# Patient Record
Sex: FEMALE | Race: WHITE | NOT HISPANIC OR LATINO | Employment: STUDENT | URBAN - METROPOLITAN AREA
[De-identification: names, ages, dates, MRNs, and addresses within clinical notes are randomized per-mention and may not be internally consistent; named-entity substitution may affect disease eponyms.]

---
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
## 2018-01-11 NOTE — PROGRESS NOTES
Assessment    1  Encounter for preventive health examination (V70 0) (Z00 00)   2  Need for hepatitis A immunization (V05 3) (Z23)   3  Never a smoker    Plan  Health Maintenance    · Always use a seat belt and shoulder strap when riding or driving a motor vehicle ;  Status:Complete;   Done: 29XNW2059   · Begin or continue regular aerobic exercise  Gradually work up to at least 3 sessions of 30  minutes of exercise a week ; Status:Complete;   Done: 42BOK7774   · Do not use aspirin for anyone under 25years of age ; Status:Complete;   Done:  80RBU9316   · Good hand washing is one of the best ways to control the spread of germs ;  Status:Complete;   Done: 35PCU0806   · There are many ways to reduce your risk of catching or spreading a sexually transmitted  Infection ; Status:Complete;   Done: 12NFX8846   · Using a latex condom can help prevent pregnancy  It can also help to prevent the spread  of sexually transmitted infections ; Status:Complete;   Done: 40QGB7637   · We recommend regular contraceptive use to prevent an unplanned pregnancy ;  Status:Complete;   Done: 26GGY0400  Need for hepatitis A immunization    · Vaqta 50 UNIT/ML Intramuscular Suspension  Need for meningococcal vaccination    · Trumenba Intramuscular Suspension Prefilled Syringe    Discussion/Summary    Impression:   No growth, development, elimination, feeding, skin and sleep concerns  no medical problems  Anticipatory guidance addressed as per the history of present illness section  encouraged to return in 6 months for Hep A #2 and Trumenba #2 Vaccinations to be administered include hepatitis A and Trumenba  No medication changes  Information discussed with patient  College form was completed and returned at the time of her visit  The treatment plan was reviewed with the patient/guardian   The patient/guardian understands and agrees with the treatment plan      Chief Complaint  Physical      History of Present Illness  HM, 12-18 years Female (Brief): Alcon Leach presents today for routine health maintenance with her mother  General Health: The last health maintenance visit was 1 5 years ago  Dental hygiene: Good The patient brushes 2 times daily  Caregiver concerns:   Caregivers deny concerns regarding nutrition, sleep, behavior, school, development and elimination  Menstrual status: The patient is menarcheal    Menses: Menstrual history: The cycles have been regular  The duration of her recent periods usually last 5-6 days  Nutrition/Elimination:   Diet:  her current diet is diverse and healthy  (limited junk - drinks mostly sweetened teas)  Dietary supplements: no daily multivitamins  No elimination issues are expressed  Sleep:  No sleep issues are reported  Sleep patterns: She sleeps for 7-8 hours at night  Behavior: Behavior issues: no tobacco use, no alcohol use, no drug use and no sexual risk taking  No behavior issues identified  Health Risks:     Risk factors: exposure to pets and 1 cat, but no household domestic violence  Safety elements used: seat belt, smoke detectors, carbon monoxide detectors and sun safety  Weekly activity:  occasional walks  Childcare/School: She is in grade 12, 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  Sports Participation Questions:      Review of Systems    Constitutional: no chills and no fever  ENT: no nasal discharge and no sore throat  Cardiovascular: no chest pain and no palpitations  Respiratory: no cough, no shortness of breath and no wheezing  Gastrointestinal: no nausea, no vomiting and no diarrhea  Genitourinary: no dysuria and no vaginal discharge  Musculoskeletal: no myalgias  Integumentary: no rashes and no skin lesions  Neurological: no headache, no dizziness and no fainting  Psychiatric: no depression and no anxiety  Hematologic/Lymphatic: no tendency for easy bleeding and no tendency for easy bruising  ROS reported by the patient  Active Problems    1  Allergic rhinitis (477 9) (J30 9)   2  Flu vaccine need (V04 81) (Z23)   3  Headache (784 0) (R51)   4  History of abdominal pain (V13 89) (Z87 898)   5  Migraine headache (346 90) (G43 909)   6  Need for revaccination (V05 9) (Z23)    Past Medical History    · History of Acute upper respiratory infection (465 9) (J06 9)   · History of abdominal pain (V13 89) (Z87 898)   · History of fatigue (V13 89) (Z87 898)   · History of pharyngitis (V12 69) (Z87 09)    Family History  Mother    · Family history of Headache Syndromes  Father    · Family history of Anxiety Disorder NOS  Maternal Grandmother    · Family history of Diabetes Mellitus (V18 0)   · Family history of malignant neoplasm of ovary (V16 41) (Z80 41)   · Family history of Headache Syndromes   · Family history of Metastatic bone cancer    Social History    · Never a smoker   · Never Drank Alcohol    Current Meds   1  Bill Hayes 1/35 1-35 MG-MCG Oral Tablet; Therapy: [id number] to (Evaluate:76Gge1557) Recorded    Allergies    1  No Known Drug Allergies    Vitals   Recorded: 58OTM0882 10:06AM   Temperature 98 1 F   Heart Rate 88   Systolic 935   Diastolic 78   Height 5 ft 5 3 in   Weight 126 lb 2 oz   BMI Calculated 20 8   BSA Calculated 1 63   BMI Percentile 42 %   2-20 Stature Percentile 65 %   2-20 Weight Percentile 52 %     Physical Exam    Constitutional - General appearance: No acute distress, well appearing and well nourished  Head and Face - Head and face: Normocephalic, atraumatic  Eyes - Conjunctiva and lids: No injection, edema or discharge  Pupils and irises: Equal, round, reactive to light bilaterally  Ears, Nose, Mouth, and Throat - External inspection of ears and nose: Normal without deformities or discharge  Otoscopic examination: Tympanic membranes gray, translucent with good bony landmarks and light reflex  Canals patent without erythema   Hearing: Normal  Nasal mucosa, septum, and turbinates: Normal, no edema or discharge  Lips, teeth, and gums: Normal, good dentition  Oropharynx: Moist mucosa, normal tongue and tonsils without lesions  Neck - Neck: Supple, symmetric, no masses  Thyroid: No thyromegaly  Pulmonary - Respiratory effort: Normal respiratory rate and rhythm, no increased work of breathing  Auscultation of lungs: Clear bilaterally  Cardiovascular - Auscultation of heart: Regular rate and rhythm, normal S1 and S2, no murmur  Peripheral vascular exam: Normal  Radial: right 2+ and left 2+  Posterior tibialis: right 2+ and left 2+  Examination of extremities for edema and/or varicosities: Normal  no edema  Abdomen - Abdomen: Normal bowel sounds, soft, non-tender, no masses  Liver and spleen: No hepatomegaly or splenomegaly  Lymphatic - Palpation of lymph nodes in neck: No anterior or posterior cervical lymphadenopathy  Musculoskeletal - Gait and station: Normal gait  Evaluation for scoliosis: No scoliosis on exam  Muscle strength/tone: Normal  Motor Strength Findings: normal upper extremity strength and normal lower extremity strength  Skin - Skin and subcutaneous tissue: Normal    Neurologic - Sensation: Normal  Sensory exam: intact to light touch  Psychiatric - Mood and affect: Normal       Results/Data  PHQ-2 Adult Depression Screening 82GAQ1102 10:09AM User, s     Test Name Result Flag Reference   PHQ-2 Adult Depression Score 0     Over the last two weeks, how often have you been bothered by any of the following problems?   Little interest or pleasure in doing things: Not at all - 0  Feeling down, depressed, or hopeless: Not at all - 0   PHQ-2 Adult Depression Screening Negative         Procedure    Procedure:   Results: 20/20 in both eyes without corrective device, 20/25 in the right eye without corrective device, 20/30 in the left eye without corrective device      Future Appointments    Date/Time Provider Specialty Site   12/21/2017 01:30 PM Richard Nurse Schedule  [address] [facility] MEDICINE     Signatures   Electronically signed by : Katherine Carroll, [address]; Jun 19 2017 11:22AM EST                       (Author)    Electronically signed by : RUSTY Diez ; Jun 19 2017  7:36PM EST

## 2018-01-13 VITALS
BODY MASS INDEX: 21.01 KG/M2 | WEIGHT: 126.13 LBS | DIASTOLIC BLOOD PRESSURE: 78 MMHG | TEMPERATURE: 98.1 F | HEART RATE: 88 BPM | SYSTOLIC BLOOD PRESSURE: 102 MMHG | HEIGHT: 65 IN

## 2018-03-07 NOTE — PROGRESS NOTES
History of Present Illness    Revaccination   Vaccine Information: Vaccine(s) Given (names): menactra 11/2/15  Spoke with family regarding vaccine out of temperature range and risks and benefits of revaccination  Action(s): Pt will be revaccinated  Pt called (attempt 1): [id number] pp  No Show Appt(s): [id number]  Revaccination Completed: [id number]  Active Problems    1  Allergic rhinitis (477 9) (J30 9)   2  Flu vaccine need (V04 81) (Z23)   3  Headache (784 0) (R51)   4  History of abdominal pain (V13 89) (Z87 898)   5  Migraine headache (346 90) (G43 909)    Immunizations  DTP/DTaP --- [facility]: 85-Kzw-4788Vlsbz Jeannine: 11-May-1999; Radha Swan: 15-Jul-1999; Series4:  16-Jan-2002; Series5: 04-Apr-2003   Hepatitis B --- Orrington Ege: 64-Xta-1243Encks Maroon: 15-Jul-1999; Series3: 16-Jan-2002   HIB --- Orrington Ege: 70-Jbi-2967Zezmh Maroon: 15-Jul-1999; Series3: 16-Jan-2002   HPV --- [facility]: 25-Jan-2011; Mae Moran: 14-Nov-2011; Series3: 13-Nov-2012   Influenza --- Series1: Temporarily Deferred: Pt refuses; Mae Moran: 44-IKM-4244Wcrrir Roes:  20-Dim-9260Vfbnnp Relic: 23-Dec-2013; Series5: 02-Nov-2015   Meningococcal --- Series1: 25-Jan-2011   MMR --- Series1: 16-Jan-2002; Mae Moran: 04-Apr-2003   Polio --- Orrington Ege: 85-Dqk-6905Hifer Maroon: 10-APC-7120; Series3: 16-Jan-2002; Series4: 21-Apr-2004   Tdap --- Orrington Ege: 07-Feb-2011   Varicella --- Series1: 16-Jan-2002; Series2: 02-Nov-2008     Current Meds   1  Kelnor 1/35 1-35 MG-MCG Oral Tablet    Allergies    1  No Known Drug Allergies    Plan    1   [facility] [address] [place] [address] Menactra Intramuscular Injectable    Education  Education Items with no Session   [facility] [address] [place] [address] Menactra Intramuscular Injectable;  Provided: 47WYJ1815 10:36AM;  Counselor: Lexa Regalado;     Signatures   Electronically signed by : Dino Rosario, [facility]; Dec 16 2016 12:42PM EST                       (Author)

## 2018-03-18 ENCOUNTER — HOSPITAL ENCOUNTER (EMERGENCY)
Facility: HOSPITAL | Age: 18
Discharge: HOME | End: 2018-03-18
Attending: EMERGENCY MEDICINE
Payer: COMMERCIAL

## 2018-03-18 VITALS
DIASTOLIC BLOOD PRESSURE: 71 MMHG | WEIGHT: 125 LBS | TEMPERATURE: 98.5 F | RESPIRATION RATE: 18 BRPM | HEART RATE: 87 BPM | SYSTOLIC BLOOD PRESSURE: 132 MMHG | OXYGEN SATURATION: 98 % | HEIGHT: 64 IN | BODY MASS INDEX: 21.34 KG/M2

## 2018-03-18 DIAGNOSIS — R55 SYNCOPE: Primary | ICD-10-CM

## 2018-03-18 LAB
ALBUMIN SERPL BCP-MCNC: 3.8 G/DL (ref 3.5–5)
ALP SERPL-CCNC: 49 U/L (ref 46–384)
ALT SERPL W P-5'-P-CCNC: 13 U/L (ref 12–78)
ANION GAP SERPL CALCULATED.3IONS-SCNC: 8 MMOL/L (ref 4–13)
AST SERPL W P-5'-P-CCNC: 15 U/L (ref 5–45)
ATRIAL RATE: 77 BPM
ATRIAL RATE: 89 BPM
BASOPHILS # BLD AUTO: 0.04 THOUSANDS/ΜL (ref 0–0.1)
BASOPHILS NFR BLD AUTO: 0 % (ref 0–1)
BILIRUB SERPL-MCNC: 0.47 MG/DL (ref 0.2–1)
BUN SERPL-MCNC: 9 MG/DL (ref 5–25)
CALCIUM SERPL-MCNC: 8.7 MG/DL (ref 8.3–10.1)
CHLORIDE SERPL-SCNC: 109 MMOL/L (ref 100–108)
CO2 SERPL-SCNC: 25 MMOL/L (ref 21–32)
CREAT SERPL-MCNC: 0.77 MG/DL (ref 0.6–1.3)
EOSINOPHIL # BLD AUTO: 0.04 THOUSAND/ΜL (ref 0–0.61)
EOSINOPHIL NFR BLD AUTO: 0 % (ref 0–6)
ERYTHROCYTE [DISTWIDTH] IN BLOOD BY AUTOMATED COUNT: 12.9 % (ref 11.6–15.1)
EXT PREG TEST URINE: NEGATIVE
GFR SERPL CREATININE-BSD FRML MDRD: 112 ML/MIN/1.73SQ M
GLUCOSE SERPL-MCNC: 93 MG/DL (ref 65–140)
HCT VFR BLD AUTO: 36.8 % (ref 34.8–46.1)
HGB BLD-MCNC: 12.6 G/DL (ref 11.5–15.4)
HOLD SPECIMEN: NORMAL
LYMPHOCYTES # BLD AUTO: 1.88 THOUSANDS/ΜL (ref 0.6–4.47)
LYMPHOCYTES NFR BLD AUTO: 21 % (ref 14–44)
MCH RBC QN AUTO: 31.3 PG (ref 26.8–34.3)
MCHC RBC AUTO-ENTMCNC: 34.2 G/DL (ref 31.4–37.4)
MCV RBC AUTO: 91 FL (ref 82–98)
MONOCYTES # BLD AUTO: 0.25 THOUSAND/ΜL (ref 0.17–1.22)
MONOCYTES NFR BLD AUTO: 3 % (ref 4–12)
NEUTROPHILS # BLD AUTO: 6.81 THOUSANDS/ΜL (ref 1.85–7.62)
NEUTS SEG NFR BLD AUTO: 76 % (ref 43–75)
NRBC BLD AUTO-RTO: 0 /100 WBCS
P AXIS: 64 DEGREES
P AXIS: 71 DEGREES
PLATELET # BLD AUTO: 372 THOUSANDS/UL (ref 149–390)
PMV BLD AUTO: 9.2 FL (ref 8.9–12.7)
POTASSIUM SERPL-SCNC: 3.6 MMOL/L (ref 3.5–5.3)
PR INTERVAL: 188 MS
PR INTERVAL: 200 MS
PROT SERPL-MCNC: 7.7 G/DL (ref 6.4–8.2)
QRS AXIS: 102 DEGREES
QRS AXIS: 93 DEGREES
QRSD INTERVAL: 70 MS
QRSD INTERVAL: 78 MS
QT INTERVAL: 336 MS
QT INTERVAL: 360 MS
QTC INTERVAL: 407 MS
QTC INTERVAL: 408 MS
RBC # BLD AUTO: 4.03 MILLION/UL (ref 3.81–5.12)
SODIUM SERPL-SCNC: 142 MMOL/L (ref 136–145)
T WAVE AXIS: 32 DEGREES
T WAVE AXIS: 7 DEGREES
VENTRICULAR RATE: 77 BPM
VENTRICULAR RATE: 89 BPM
WBC # BLD AUTO: 9.05 THOUSAND/UL (ref 4.31–10.16)

## 2018-03-18 PROCEDURE — 93010 ELECTROCARDIOGRAM REPORT: CPT | Performed by: INTERNAL MEDICINE

## 2018-03-18 PROCEDURE — 93005 ELECTROCARDIOGRAM TRACING: CPT

## 2018-03-18 PROCEDURE — 85025 COMPLETE CBC W/AUTO DIFF WBC: CPT | Performed by: EMERGENCY MEDICINE

## 2018-03-18 PROCEDURE — 99285 EMERGENCY DEPT VISIT HI MDM: CPT

## 2018-03-18 PROCEDURE — 96360 HYDRATION IV INFUSION INIT: CPT

## 2018-03-18 PROCEDURE — 81025 URINE PREGNANCY TEST: CPT | Performed by: EMERGENCY MEDICINE

## 2018-03-18 PROCEDURE — 80053 COMPREHEN METABOLIC PANEL: CPT | Performed by: EMERGENCY MEDICINE

## 2018-03-18 PROCEDURE — 36415 COLL VENOUS BLD VENIPUNCTURE: CPT

## 2018-03-18 RX ADMIN — SODIUM CHLORIDE 1000 ML: 0.9 INJECTION, SOLUTION INTRAVENOUS at 15:33

## 2018-03-18 NOTE — ED PROVIDER NOTES
History  Chief Complaint   Patient presents with    Loss of Consciousness     Patient reports she was in bathroom when she started to feel super hot and went outside and had a syncopal episode; pt  reports +Loc, patient states she hit her face/mouth; pt  states nothing like this has ever happened to her before      21year old female presents for evaluation of syncope  Patient reports she was getting ready for work this morning and while brushing her hair she started to feel sweaty and lightheaded  She then walked outside to get some fresh air and passed out, fell down a few steps and states she hit her face on the concrete  She believes she woke up immediately but states she still felt dazed  Patient's boyfriend and sister report she appeared very diaphoretic and pale  She drank mountain dew and improved significantly  She is currently asymptomatic  Denies preceding chest pain, sob, headache, fam hx of sudden cardiac death  None       History reviewed  No pertinent past medical history  History reviewed  No pertinent surgical history  History reviewed  No pertinent family history  I have reviewed and agree with the history as documented  Social History   Substance Use Topics    Smoking status: Never Smoker    Smokeless tobacco: Never Used    Alcohol use Yes        Review of Systems   Constitutional: Positive for diaphoresis  Negative for chills and fever  HENT: Negative for congestion and rhinorrhea  Eyes: Negative for pain and visual disturbance  Respiratory: Negative for cough, shortness of breath and wheezing  Cardiovascular: Negative for chest pain and leg swelling  Gastrointestinal: Positive for nausea  Negative for abdominal pain, diarrhea and vomiting  Genitourinary: Negative for difficulty urinating, dysuria, frequency and urgency  Musculoskeletal: Negative for back pain and neck pain  Skin: Negative for color change and rash     Neurological: Positive for syncope and light-headedness  Negative for numbness and headaches  All other systems reviewed and are negative  Physical Exam  ED Triage Vitals [03/18/18 1421]   Temperature Pulse Respirations Blood Pressure SpO2   98 5 °F (36 9 °C) 98 18 153/95 98 %      Temp Source Heart Rate Source Patient Position - Orthostatic VS BP Location FiO2 (%)   Tympanic Monitor Sitting Left arm --      Pain Score       No Pain           Orthostatic Vital Signs  Vitals:    03/18/18 1421 03/18/18 1600   BP: 153/95 132/71   Pulse: 98 87   Patient Position - Orthostatic VS: Sitting Sitting       Physical Exam   Constitutional: She is oriented to person, place, and time  She appears well-developed and well-nourished  No distress  HENT:   Head: Normocephalic and atraumatic  Eyes: Conjunctivae and EOM are normal    Neck: Normal range of motion  Neck supple  Cardiovascular: Normal rate and regular rhythm  Pulmonary/Chest: Effort normal and breath sounds normal  No respiratory distress  She has no wheezes  She has no rales  Abdominal: Soft  Bowel sounds are normal  There is no tenderness  There is no guarding  Musculoskeletal: Normal range of motion  She exhibits no edema or tenderness  Neurological: She is alert and oriented to person, place, and time  No cranial nerve deficit  Coordination normal    HINTS exam reassuring, no dysdiadochokinesis, finger to nose intact, heel-to-shin intact, negative Romberg, able to ambulate  Skin: Skin is warm  She is not diaphoretic  No erythema  Psychiatric: She has a normal mood and affect  Her behavior is normal    Nursing note and vitals reviewed        ED Medications  Medications   sodium chloride 0 9 % bolus 1,000 mL (0 mL Intravenous Stopped 3/18/18 1615)       Diagnostic Studies  Results Reviewed     Procedure Component Value Units Date/Time    POCT pregnancy, urine [[id number]]  (Normal) Resulted:  03/18/18 1532    Lab Status:  Final result Updated:  03/18/18 1533     EXT PREG TEST UR (Ref: Negative) negative    Comprehensive metabolic panel [[id number]]  (Abnormal) Collected:  03/18/18 1439    Lab Status:  Final result Specimen:  Blood from Arm, Left Updated:  03/18/18 1522     Sodium 142 mmol/L      Potassium 3 6 mmol/L      Chloride 109 (H) mmol/L      CO2 25 mmol/L      Anion Gap 8 mmol/L      BUN 9 mg/dL      Creatinine 0 77 mg/dL      Glucose 93 mg/dL      Calcium 8 7 mg/dL      AST 15 U/L      ALT 13 U/L      Alkaline Phosphatase 49 U/L      Total Protein 7 7 g/dL      Albumin 3 8 g/dL      Total Bilirubin 0 47 mg/dL      eGFR 112 ml/min/1 73sq m     Narrative:         National Kidney Disease Education Program recommendations are as follows:  GFR calculation is accurate only with a steady state creatinine  Chronic Kidney disease less than 60 ml/min/1 73 sq  meters  Kidney failure less than 15 ml/min/1 73 sq  meters      CBC and differential [[id number]]  (Abnormal) Collected:  03/18/18 1439    Lab Status:  Final result Specimen:  Blood from Arm, Left Updated:  03/18/18 1514     WBC 9 05 Thousand/uL      RBC 4 03 Million/uL      Hemoglobin 12 6 g/dL      Hematocrit 36 8 %      MCV 91 fL      MCH 31 3 pg      MCHC 34 2 g/dL      RDW 12 9 %      MPV 9 2 fL      Platelets 193 Thousands/uL      nRBC 0 /100 WBCs      Neutrophils Relative 76 (H) %      Lymphocytes Relative 21 %      Monocytes Relative 3 (L) %      Eosinophils Relative 0 %      Basophils Relative 0 %      Neutrophils Absolute 6 81 Thousands/µL      Lymphocytes Absolute 1 88 Thousands/µL      Monocytes Absolute 0 25 Thousand/µL      Eosinophils Absolute 0 04 Thousand/µL      Basophils Absolute 0 04 Thousands/µL                  No orders to display         Procedures  Procedures      Phone Consults  ED Phone Contact    ED Course  ED Course as of Mar 19 1714   Silva Briggs Mar 18, 2018   1533 Procedure Note: EKG  Date/Time: 03/18/18 3:33 PM   Performed by: Cecile Cárdenas  Authorized by: Cecile Cárdenas  ECG interpreted by me, the ED Provider: yes The EKG demonstrates:  Rate 89  Rhythm NSR  QTc 408  No ST elevatons/depressions, diffuse TWI lead III, aVF, V3      1533 Repeat EKG with new leads: TWI improved in lead III, flattened t waves aVF, V3                                [facility]  Number of Diagnoses or Management Options  Syncope:   Diagnosis management comments: 66-year-old female presenting with syncope  Obtain labs, EKG, pregnancy test   The patient currently asymptomatic, likely vasovagal syncope  Follow up with primary care provider  CritCare Time    Disposition  Final diagnoses:   Syncope     Time reflects when diagnosis was documented in both MDM as applicable and the Disposition within this note     Time User Action Codes Description Comment    3/18/2018  3:57 PM Hal Pacheco Add [R55] Syncope       ED Disposition     ED Disposition Condition Comment    Discharge  [address] [place] [address] discharge to home/self care  Condition at discharge: Stable        Follow-up Information     Follow up With Specialties Details Why Contact Info Additional [address] , [place] Family Medicine  For further evaluation 9333  [address] Emergency Department Emergency Medicine  If symptoms worsen [address]  [phone]  ED, [address] [place] [address], [facility] [place]    Edy Krause MD Cardiology, Multidisciplinary  As needed [address]  [phone]           There are no discharge medications for this patient  No discharge procedures on file  ED Provider  Attending physically available and evaluated [address] [place] [address]  I managed the patient along with the ED Attending      Electronically Signed by         Mel Laboy DO  03/19/18 0072

## 2018-03-18 NOTE — DISCHARGE INSTRUCTIONS
Syncope   WHAT YOU NEED TO KNOW:   Syncope is also called fainting or passing out  Syncope is a sudden, temporary loss of consciousness, followed by a fall from a standing or sitting position  Syncope ranges from not serious to a sign of a more serious condition that needs to be treated  You can control some health conditions that cause syncope  Your healthcare providers can help you create a plan to manage syncope and prevent episodes  DISCHARGE INSTRUCTIONS:   Seek care immediately if:   · You are bleeding because you hit your head when you fainted  · You suddenly have double vision, difficulty speaking, numbness, and cannot move your arms or legs  · You have chest pain and trouble breathing  · You vomit blood or material that looks like coffee grounds  · You see blood in your bowel movement  Contact your healthcare provider if:   · You have new or worsening symptoms  · You have another syncope episode  · You have a headache, fast heartbeat, or feel too dizzy to stand up  · You have questions or concerns about your condition or care  Follow up with your healthcare provider as directed:  Write down your questions so you remember to ask them during your visits  Manage syncope:   · Keep a record of your syncope episodes  Include your symptoms and your activity before and after the episode  The record can help your healthcare provider find the cause of your syncope and help you manage episodes  · Sit or lie down when needed  This includes when you feel dizzy, your throat is getting tight, and your vision changes  Raise your legs above the level of your heart  · Take slow, deep breaths if you start to breathe faster with anxiety or fear  This can help decrease dizziness and the feeling that you might faint  · Check your blood pressure often  This is important if you take medicine to lower your blood pressure   Check your blood pressure when you are lying down and when you are standing  Ask how often to check during the day  Keep a record of your blood pressure numbers  Your healthcare provider may use the record to help plan your treatment  Prevent a syncope episode:   · Move slowly and let yourself get used to one position before you move to another position  This is very important when you change from a lying or sitting position to a standing position  Take some deep breaths before you stand up from a lying position  Stand up slowly  Sudden movements may cause a fainting spell  Sit on the side of the bed or couch for a few minutes before you stand up  If you are on bedrest, try to be upright for about 2 hours each day, or as directed  Do not lock your legs if you are standing for a long period of time  Move your legs and bend your knees to keep blood flowing  · Follow your healthcare provider's recommendations  Your provider may  recommend that you drink more liquids to prevent dehydration  You may also need to have more salt to keep your blood pressure from dropping too low and causing syncope  Your provider will tell you how much liquid and sodium to have each day  · Watch for signs of low blood sugar  These include hunger, nervousness, sweating, and fast or fluttery heartbeats  Talk with your healthcare provider about ways to keep your blood sugar level steady  · Do not strain if you are constipated  You may faint if you strain to have a bowel movement  Walking is the best way to get your bowels moving  Eat foods high in fiber to make it easier to have a bowel movement  Good examples are high-fiber cereals, beans, vegetables, and whole-grain breads  Prune juice may help make bowel movements softer  · Be careful in hot weather  Heat can cause a syncope episode  Limit activity done outside on hot days  Physical activity in hot weather can lead to dehydration  This can cause an episode    © 2017 [employer] Information is for End User's use only and may not be sold, redistributed or otherwise used for commercial purposes  All illustrations and images included in CareNotes® are the copyrighted property of A D A M , Inc  or [place]  The above information is an  only  It is not intended as medical advice for individual conditions or treatments  Talk to your doctor, nurse or pharmacist before following any medical regimen to see if it is safe and effective for you

## 2018-03-18 NOTE — ED ATTENDING ATTESTATION
Pushpa Smith MD, saw and evaluated the patient  I have discussed the patient with the resident/non-physician practitioner and agree with the resident's/non-physician practitioner's findings, Plan of Care, and MDM as documented in the resident's/non-physician practitioner's note, except where noted  All available labs and Radiology studies were reviewed  At this point I agree with the current assessment done in the Emergency Department  I have conducted an independent evaluation of this patient a history and physical is as follows:      Critical Care Time  CritCare Time    Procedures     20 yo female had syncopal episode this morning while getting ready for work  Pt felt lightheaded while brushing hair  Pt went outside for fresh air and syncopized and fell and hit lip  Brief period of loc  Pt witnessed to be diaphoretic and acting funny for 10 minutes  No pmh, pt takes bcp  No cp, no sob, no current symptoms  Vss, afebrile, lungs cta, rrr, abdomen soft nontender, no neuro deficits  Labs, urine preg, ekg, ivf

## 2018-03-27 RX ORDER — ETHYNODIOL DIACETATE AND ETHINYL ESTRADIOL 1 MG-35MCG
1 KIT ORAL DAILY
Refills: 1 | COMMUNITY
Start: 2018-02-27 | End: 2021-05-13

## 2018-03-27 RX ORDER — HYDROCODONE BITARTRATE AND ACETAMINOPHEN 5; 325 MG/1; MG/1
1 TABLET ORAL EVERY 6 HOURS PRN
Refills: 0 | COMMUNITY
Start: 2018-01-03 | End: 2018-03-28

## 2018-03-27 RX ORDER — CHLORHEXIDINE GLUCONATE 0.12 MG/ML
RINSE ORAL
Refills: 0 | COMMUNITY
Start: 2018-02-08 | End: 2018-03-28

## 2018-03-28 ENCOUNTER — OFFICE VISIT (OUTPATIENT)
Dept: FAMILY MEDICINE CLINIC | Facility: CLINIC | Age: 18
End: 2018-03-28
Payer: COMMERCIAL

## 2018-03-28 VITALS
HEART RATE: 84 BPM | SYSTOLIC BLOOD PRESSURE: 116 MMHG | RESPIRATION RATE: 16 BRPM | TEMPERATURE: 98.8 F | BODY MASS INDEX: 21.89 KG/M2 | WEIGHT: 128.2 LBS | HEIGHT: 64 IN | DIASTOLIC BLOOD PRESSURE: 68 MMHG

## 2018-03-28 DIAGNOSIS — R55 VASOVAGAL SYNCOPE: ICD-10-CM

## 2018-03-28 DIAGNOSIS — R94.31 ABNORMAL EKG: ICD-10-CM

## 2018-03-28 DIAGNOSIS — J30.9 CHRONIC ALLERGIC RHINITIS, UNSPECIFIED SEASONALITY, UNSPECIFIED TRIGGER: Primary | ICD-10-CM

## 2018-03-28 PROCEDURE — 3008F BODY MASS INDEX DOCD: CPT | Performed by: FAMILY MEDICINE

## 2018-03-28 PROCEDURE — 99214 OFFICE O/P EST MOD 30 MIN: CPT | Performed by: FAMILY MEDICINE

## 2018-03-28 RX ORDER — FLUTICASONE PROPIONATE 50 MCG
1 SPRAY, SUSPENSION (ML) NASAL DAILY
Qty: 16 G | Refills: 0 | Status: SHIPPED | OUTPATIENT
Start: 2018-03-28 | End: 2019-04-18

## 2018-03-28 NOTE — PROGRESS NOTES
Assessment/Plan:    No problem-specific Assessment & Plan notes found for this encounter  Diagnoses and all orders for this visit:    Chronic allergic rhinitis, unspecified seasonality, unspecified trigger  -     fluticasone (FLONASE) 50 mcg/act nasal spray; 1 spray into each nostril daily    Vasovagal syncope  -     Echo complete with contrast if indicated; Future    Abnormal EKG  -     Echo complete with contrast if indicated; Future        The patient appears to have most likely had a vasovagal syncope event  It is possible this is post micturition, however it is also possible that it was induced by anxiety, heat, or simply idiopathic  She also reports feeling a little ill at that time which may have been contributory  Never the less, because she had some abnormalities on her EKG she was recommend in the emergency room to have an echo possibly in follow-up, and I agree that this is not a bad idea to rule out any other insidious ideology  She is agreeable as is her mother who accompanied her today  The patient is to stay fluid hydrated, she is to rest when needed, and I have recommended that she could start taking Flonase to help with her seasonal allergic rhinitis and allergies  She is also recommended that she could take Zyrtec, Claritin, or Allegra for seasonal allergy relief  If she has that no other symptomatic event of syncope I would like her to return to the emergency room for re-evaluation  Subjective:      Patient ID: Ginette Erickson is a 21 y o  female  This is a 47-year-old female who is spending the night at her boyfriend's house when in the morning she woke up she felt the urge to urinate  She went to the bathroom and urinated and soon after felt very lightheaded and felt she lost her vision  She made her way outside for fresh air and fell to the floor and hit her face forward on concrete    She does not believe that she passed out for very long, she came to within seconds she reports and she was attended to by her boyfriend and his father  She was given a [employer] to drink which made her feel better  She then went back to sleep for several hours  Upon awakening she went to the emergency room for evaluation  She had a cardiac workup and initially had some T-wave inversion on her EKG  Her lab work was otherwise grossly normal   She was feeling better and asymptomatic in the emergency room was discharged home with follow-up with PCP  She said the next day she felt woozy and did not go to work but now is feeling totally fine  She does note that she has had some allergy symptoms which may have been contributing to her lightheaded feeling  Today he has no symptoms, no fevers or chills, no chest pain or trouble breathing  At the time of the event she did not have any chest pain, diaphoresis, shortness of breath  She said she felt like she had a hot flash and then all of her vision went black  The following portions of the patient's history were reviewed and updated as appropriate: allergies, current medications, past family history, past medical history, past social history, past surgical history and problem list     Review of Systems   Constitutional: Positive for fatigue (Around the time of the event)  Negative for chills and fever  HENT: Positive for congestion and postnasal drip  Respiratory: Negative for cough, shortness of breath and wheezing  Cardiovascular: Negative for chest pain, palpitations and leg swelling  Gastrointestinal: Negative  Endocrine: Negative for cold intolerance and heat intolerance  Musculoskeletal: Negative  Allergic/Immunologic: Positive for environmental allergies  Neurological: Positive for syncope and headaches (Chronic, report taking ibuprofen several times weekly, sometimes daily)  Hematological: Negative for adenopathy  Does not bruise/bleed easily  Psychiatric/Behavioral: Negative            Objective:      BP 116/68   Pulse 84   Temp 98 8 °F (37 1 °C)   Resp 16   Ht 5' 4" (1 626 m)   Wt 58 2 kg (128 lb 3 2 oz)   LMP 02/28/2018   BMI 22 01 kg/m²          Physical Exam   Constitutional: She is oriented to person, place, and time  Vital signs are normal  She appears well-developed and well-nourished  Non-toxic appearance  No distress  Appears Stated Age, normal range weight   HENT:   Head: Normocephalic and atraumatic  Nose: Nose normal    Mouth/Throat: Oropharynx is clear and moist  No oropharyngeal exudate  Eyes: Conjunctivae and EOM are normal  Pupils are equal, round, and reactive to light  Right eye exhibits no discharge  Left eye exhibits no discharge  Left conjunctiva is not injected  No scleral icterus  Right eye exhibits no nystagmus  Left eye exhibits no nystagmus  Accomodation intact   Neck: Normal range of motion  Neck supple  Carotid bruit is not present  Cardiovascular: Normal rate, regular rhythm, S1 normal and S2 normal   Exam reveals no gallop and no friction rub  No murmur heard  Pulmonary/Chest: Effort normal and breath sounds normal  No respiratory distress  She has no wheezes  She has no rhonchi  She has no rales  Abdominal: Soft  Bowel sounds are normal  She exhibits no distension  There is no tenderness  There is no rebound and no guarding  Musculoskeletal: She exhibits no edema  No clubbing or cyanosis   Lymphadenopathy:     She has no cervical adenopathy  Neurological: She is alert and oriented to person, place, and time  No focal neurologic deficits noted on exam, no change from baseline status   Skin: Skin is warm and dry  No rash noted  She is not diaphoretic  Psychiatric: She has a normal mood and affect  Her behavior is normal  Thought content normal    stable   Vitals reviewed

## 2018-03-28 NOTE — PATIENT INSTRUCTIONS
Chronic allergic rhinitis, unspecified seasonality, unspecified trigger  -     fluticasone (FLONASE) 50 mcg/act nasal spray; 1 spray into each nostril daily    Vasovagal syncope  -     Echo complete with contrast if indicated; Future    Abnormal EKG  -     Echo complete with contrast if indicated;  Future

## 2018-04-17 ENCOUNTER — HOSPITAL ENCOUNTER (OUTPATIENT)
Dept: NON INVASIVE DIAGNOSTICS | Facility: CLINIC | Age: 18
Discharge: HOME | End: 2018-04-17
Payer: COMMERCIAL

## 2018-04-17 DIAGNOSIS — R55 VASOVAGAL SYNCOPE: ICD-10-CM

## 2018-04-17 DIAGNOSIS — R94.31 ABNORMAL EKG: ICD-10-CM

## 2018-04-17 PROCEDURE — 93306 TTE W/DOPPLER COMPLETE: CPT

## 2018-04-17 PROCEDURE — 93306 TTE W/DOPPLER COMPLETE: CPT | Performed by: INTERNAL MEDICINE

## 2018-11-14 ENCOUNTER — OFFICE VISIT (OUTPATIENT)
Dept: FAMILY MEDICINE CLINIC | Facility: CLINIC | Age: 18
End: 2018-11-14
Payer: COMMERCIAL

## 2018-11-14 VITALS
HEART RATE: 82 BPM | DIASTOLIC BLOOD PRESSURE: 80 MMHG | OXYGEN SATURATION: 99 % | WEIGHT: 124 LBS | HEIGHT: 64 IN | TEMPERATURE: 98.6 F | SYSTOLIC BLOOD PRESSURE: 114 MMHG | BODY MASS INDEX: 21.17 KG/M2

## 2018-11-14 DIAGNOSIS — Z23 FLU VACCINE NEED: ICD-10-CM

## 2018-11-14 DIAGNOSIS — N30.00 ACUTE CYSTITIS WITHOUT HEMATURIA: Primary | ICD-10-CM

## 2018-11-14 LAB
SL AMB  POCT GLUCOSE, UA: NEGATIVE
SL AMB LEUKOCYTE ESTERASE,UA: NEGATIVE
SL AMB POCT BILIRUBIN,UA: NEGATIVE
SL AMB POCT BLOOD,UA: ABNORMAL
SL AMB POCT CLARITY,UA: CLEAR
SL AMB POCT COLOR,UA: ABNORMAL
SL AMB POCT KETONES,UA: NEGATIVE
SL AMB POCT NITRITE,UA: NEGATIVE
SL AMB POCT PH,UA: 7
SL AMB POCT SPECIFIC GRAVITY,UA: 1.01
SL AMB POCT URINE PROTEIN: NEGATIVE
SL AMB POCT UROBILINOGEN: 0.2

## 2018-11-14 PROCEDURE — 87186 SC STD MICRODIL/AGAR DIL: CPT | Performed by: FAMILY MEDICINE

## 2018-11-14 PROCEDURE — 90471 IMMUNIZATION ADMIN: CPT

## 2018-11-14 PROCEDURE — 87086 URINE CULTURE/COLONY COUNT: CPT | Performed by: FAMILY MEDICINE

## 2018-11-14 PROCEDURE — 3725F SCREEN DEPRESSION PERFORMED: CPT

## 2018-11-14 PROCEDURE — 81002 URINALYSIS NONAUTO W/O SCOPE: CPT | Performed by: FAMILY MEDICINE

## 2018-11-14 PROCEDURE — 99213 OFFICE O/P EST LOW 20 MIN: CPT | Performed by: FAMILY MEDICINE

## 2018-11-14 PROCEDURE — 90686 IIV4 VACC NO PRSV 0.5 ML IM: CPT

## 2018-11-14 PROCEDURE — 87077 CULTURE AEROBIC IDENTIFY: CPT | Performed by: FAMILY MEDICINE

## 2018-11-14 PROCEDURE — 1036F TOBACCO NON-USER: CPT | Performed by: FAMILY MEDICINE

## 2018-11-14 PROCEDURE — 3008F BODY MASS INDEX DOCD: CPT | Performed by: FAMILY MEDICINE

## 2018-11-14 RX ORDER — CIPROFLOXACIN 250 MG/1
500 TABLET, FILM COATED ORAL EVERY 12 HOURS SCHEDULED
Qty: 6 TABLET | Refills: 0 | Status: SHIPPED | OUTPATIENT
Start: 2018-11-14 | End: 2018-11-17

## 2018-11-14 NOTE — PROGRESS NOTES
Assessment/Plan:    No problem-specific Assessment & Plan notes found for this encounter  Diagnoses and all orders for this visit:    Acute cystitis without hematuria  -     ciprofloxacin (CIPRO) 250 mg tablet; Take 2 tablets (500 mg total) by mouth every 12 (twelve) hours for 3 days  -     POCT urine dip  -     Urine culture    Flu vaccine need  -     SYRINGE/SINGLE-DOSE VIAL: influenza vaccine, [id number], quadrivalent, 0 5 mL, preservative-free, for patients 3-17 yr (AFLURIA, FLUARIX, FLULAVAL, FLUZONE)          Subjective:      Patient ID: Taryn Valle is a 21 y o  female  She developed UTI symptoms 2 days ago  Symptoms have improved since she has been drinking more water and taking the azo medication  She has had frequency and strong smell to the urine  She noticed some blood in the urine as well  She denies fever, chills or back pain  The following portions of the patient's history were reviewed and updated as appropriate: allergies, current medications, past family history, past medical history, past social history, past surgical history and problem list     Review of Systems   Constitutional: Negative for chills and fever  Genitourinary: Positive for frequency  Negative for flank pain and urgency  Objective:      /80   Pulse 82   Temp 98 6 °F (37 °C) (Tympanic)   Ht 5' 4" (1 626 m)   Wt 56 2 kg (124 lb)   SpO2 99%   BMI 21 28 kg/m²          Physical Exam   Constitutional: She appears well-developed and well-nourished  Neck: Normal range of motion  Neck supple  Cardiovascular: Normal rate and regular rhythm  Pulmonary/Chest: Effort normal and breath sounds normal    Abdominal: Soft  Bowel sounds are normal  There is no tenderness  Nursing note and vitals reviewed

## 2018-11-16 LAB — BACTERIA UR CULT: ABNORMAL

## 2019-04-18 ENCOUNTER — OFFICE VISIT (OUTPATIENT)
Dept: FAMILY MEDICINE CLINIC | Facility: CLINIC | Age: 19
End: 2019-04-18
Payer: COMMERCIAL

## 2019-04-18 VITALS
BODY MASS INDEX: 21.27 KG/M2 | SYSTOLIC BLOOD PRESSURE: 120 MMHG | DIASTOLIC BLOOD PRESSURE: 72 MMHG | HEIGHT: 64 IN | RESPIRATION RATE: 16 BRPM | WEIGHT: 124.6 LBS | OXYGEN SATURATION: 98 % | HEART RATE: 64 BPM | TEMPERATURE: 98.2 F

## 2019-04-18 DIAGNOSIS — R59.1 LYMPHADENOPATHY OF HEAD AND NECK: Primary | ICD-10-CM

## 2019-04-18 PROCEDURE — 3008F BODY MASS INDEX DOCD: CPT | Performed by: INTERNAL MEDICINE

## 2019-04-18 PROCEDURE — 99213 OFFICE O/P EST LOW 20 MIN: CPT | Performed by: INTERNAL MEDICINE

## 2019-04-18 PROCEDURE — 1036F TOBACCO NON-USER: CPT | Performed by: INTERNAL MEDICINE

## 2019-07-25 ENCOUNTER — TELEPHONE (OUTPATIENT)
Dept: FAMILY MEDICINE CLINIC | Facility: CLINIC | Age: 19
End: 2019-07-25

## 2019-08-05 ENCOUNTER — TELEPHONE (OUTPATIENT)
Dept: FAMILY MEDICINE CLINIC | Facility: CLINIC | Age: 19
End: 2019-08-05

## 2019-08-05 NOTE — TELEPHONE ENCOUNTER
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 Needs ASAP

## 2019-08-06 ENCOUNTER — CLINICAL SUPPORT (OUTPATIENT)
Dept: FAMILY MEDICINE CLINIC | Facility: CLINIC | Age: 19
End: 2019-08-06
Payer: COMMERCIAL

## 2019-08-06 DIAGNOSIS — Z11.1 ENCOUNTER FOR PPD TEST: ICD-10-CM

## 2019-08-06 DIAGNOSIS — Z23 NEED FOR MMR VACCINE: Primary | ICD-10-CM

## 2019-08-06 PROCEDURE — 86580 TB INTRADERMAL TEST: CPT

## 2019-08-06 PROCEDURE — 90471 IMMUNIZATION ADMIN: CPT

## 2019-08-06 PROCEDURE — 90707 MMR VACCINE SC: CPT

## 2019-08-08 ENCOUNTER — CLINICAL SUPPORT (OUTPATIENT)
Dept: FAMILY MEDICINE CLINIC | Facility: CLINIC | Age: 19
End: 2019-08-08

## 2019-08-08 DIAGNOSIS — Z11.1 ENCOUNTER FOR PPD SKIN TEST READING: Primary | ICD-10-CM

## 2019-08-08 LAB
INDURATION: 0 MM
TB SKIN TEST: NEGATIVE

## 2019-09-05 ENCOUNTER — CLINICAL SUPPORT (OUTPATIENT)
Dept: FAMILY MEDICINE CLINIC | Facility: CLINIC | Age: 19
End: 2019-09-05
Payer: COMMERCIAL

## 2019-09-05 DIAGNOSIS — Z23 NEED FOR HEPATITIS B VACCINATION: Primary | ICD-10-CM

## 2019-09-05 PROCEDURE — 90746 HEPB VACCINE 3 DOSE ADULT IM: CPT

## 2019-09-05 PROCEDURE — 90471 IMMUNIZATION ADMIN: CPT

## 2019-10-18 ENCOUNTER — CLINICAL SUPPORT (OUTPATIENT)
Dept: FAMILY MEDICINE CLINIC | Facility: CLINIC | Age: 19
End: 2019-10-18
Payer: COMMERCIAL

## 2019-10-18 DIAGNOSIS — Z23 NEED FOR INFLUENZA VACCINATION: ICD-10-CM

## 2019-10-18 DIAGNOSIS — Z23 NEED FOR HEPATITIS B VACCINATION: Primary | ICD-10-CM

## 2019-10-18 PROCEDURE — 90746 HEPB VACCINE 3 DOSE ADULT IM: CPT | Performed by: PHYSICIAN ASSISTANT

## 2019-10-18 PROCEDURE — 90686 IIV4 VACC NO PRSV 0.5 ML IM: CPT | Performed by: PHYSICIAN ASSISTANT

## 2019-10-18 PROCEDURE — 90472 IMMUNIZATION ADMIN EACH ADD: CPT | Performed by: PHYSICIAN ASSISTANT

## 2019-10-18 PROCEDURE — 90471 IMMUNIZATION ADMIN: CPT | Performed by: PHYSICIAN ASSISTANT

## 2019-12-17 ENCOUNTER — TELEPHONE (OUTPATIENT)
Dept: FAMILY MEDICINE CLINIC | Facility: CLINIC | Age: 19
End: 2019-12-17

## 2020-01-10 ENCOUNTER — OFFICE VISIT (OUTPATIENT)
Dept: FAMILY MEDICINE CLINIC | Facility: CLINIC | Age: 20
End: 2020-01-10
Payer: COMMERCIAL

## 2020-01-10 VITALS
BODY MASS INDEX: 21.82 KG/M2 | WEIGHT: 127.8 LBS | OXYGEN SATURATION: 96 % | RESPIRATION RATE: 20 BRPM | HEART RATE: 79 BPM | DIASTOLIC BLOOD PRESSURE: 76 MMHG | SYSTOLIC BLOOD PRESSURE: 120 MMHG | HEIGHT: 64 IN | TEMPERATURE: 98.5 F

## 2020-01-10 DIAGNOSIS — J01.90 ACUTE SINUSITIS, RECURRENCE NOT SPECIFIED, UNSPECIFIED LOCATION: Primary | ICD-10-CM

## 2020-01-10 PROCEDURE — 99213 OFFICE O/P EST LOW 20 MIN: CPT | Performed by: PHYSICIAN ASSISTANT

## 2020-01-10 PROCEDURE — 3725F SCREEN DEPRESSION PERFORMED: CPT | Performed by: PHYSICIAN ASSISTANT

## 2020-01-10 PROCEDURE — 1036F TOBACCO NON-USER: CPT | Performed by: PHYSICIAN ASSISTANT

## 2020-01-10 PROCEDURE — 3008F BODY MASS INDEX DOCD: CPT | Performed by: PHYSICIAN ASSISTANT

## 2020-01-10 RX ORDER — AMOXICILLIN AND CLAVULANATE POTASSIUM 875; 125 MG/1; MG/1
1 TABLET, FILM COATED ORAL EVERY 12 HOURS SCHEDULED
Qty: 20 TABLET | Refills: 0 | Status: SHIPPED | OUTPATIENT
Start: 2020-01-10 | End: 2020-01-20

## 2020-01-10 RX ORDER — FLUTICASONE PROPIONATE 50 MCG
2 SPRAY, SUSPENSION (ML) NASAL DAILY
Qty: 16 G | Refills: 0 | Status: SHIPPED | OUTPATIENT
Start: 2020-01-10 | End: 2020-05-24 | Stop reason: SDUPTHER

## 2020-01-10 NOTE — PROGRESS NOTES
FAMILY PRACTICE ACUTE OFFICE VISIT  [facility]       NAME: Sunil Hartman  AGE: 22 y o  SEX: female       : 2000        MRN: [id number]    DATE: 1/10/2020  TIME: 3:22 PM    Assessment and Plan     Problem List Items Addressed This Visit     None      Visit Diagnoses     Acute sinusitis, recurrence not specified, unspecified location    -  Primary    Start Augmentin twice daily x 10 days and Flonase 2 sprays/nostril daily  Increase fluids and rest  Call if worsens or persists  Relevant Medications    amoxicillin-clavulanate (AUGMENTIN) 875-125 mg per tablet    fluticasone (FLONASE) 50 mcg/act nasal spray                Chief Complaint     Chief Complaint   Patient presents with    Cold Like Symptoms     sinus pressure, coughing, ear pressure for the past week and half, lymph nodes swollen in the back of her neck       History of Present Illness   Sarah Stoner is a 22y o -year-old female who presents for sinus pressure and coughing  URI    This is a new (no known exposure) problem  Episode onset: about 10 days  There has been no fever  Associated symptoms include congestion, coughing, rhinorrhea, a sore throat and wheezing (slight)  Pertinent negatives include no diarrhea, ear pain (but are popping), nausea or vomiting  Treatments tried: Dayquil and then allan seltzer cold and flu  The treatment provided no relief  Review of Systems   Review of Systems   Constitutional: Negative for chills and fever (up to 99 5 the last few days)  HENT: Positive for congestion, postnasal drip, rhinorrhea, sinus pressure and sore throat  Negative for ear pain (but are popping)  Respiratory: Positive for cough and wheezing (slight)  Negative for chest tightness and shortness of breath  Gastrointestinal: Negative for diarrhea, nausea and vomiting  Neurological: Negative for dizziness and light-headedness         Active Problem List Patient Active Problem List   Diagnosis    Allergic rhinitis    Family history of breast cancer    Family history of ovarian cancer    Headache    Surveillance for birth control, oral contraceptives    Lymphadenopathy of head and neck         Social History:  Social History     Socioeconomic History    Marital status: Single     Spouse name: Not on file    Number of children: Not on file    Years of education: Not on file    Highest education level: Not on file   Occupational History    Not on file   Social Needs    Financial resource strain: Not on file    Food insecurity:     Worry: Not on file     Inability: Not on file    Transportation needs:     Medical: Not on file     Non-medical: Not on file   Tobacco Use    Smoking status: Never Smoker    Smokeless tobacco: Never Used   Substance and Sexual Activity    Alcohol use: Yes     Frequency: Monthly or less     Drinks per session: 1 or 2    Drug use: No    Sexual activity: Yes     Partners: Male     Comment: birth control pills   Lifestyle    Physical activity:     Days per week: Not on file     Minutes per session: Not on file    Stress: Not on file   Relationships    Social connections:     Talks on phone: Not on file     Gets together: Not on file     Attends Qu[employer] service: Not on file     Active member of club or organization: Not on file     Attends meetings of clubs or organizations: Not on file     Relationship status: Not on file    Intimate partner violence:     Fear of current or ex partner: Not on file     Emotionally abused: Not on file     Physically abused: Not on file     Forced sexual activity: Not on file   Other Topics Concern    Not on file   Social History Narrative    Not on file       Objective     Vitals:    01/10/20 1420   BP: 120/76   BP Location: Right arm   Patient Position: Sitting   Cuff Size: Standard   Pulse: 79   Resp: 20   Temp: 98 5 °F (36 9 °C)   SpO2: 96%   Weight: 58 kg (127 lb 12 8 oz) Height: 5' 4" (1 626 m)     Wt Readings from Last 3 Encounters:   01/10/20 58 kg (127 lb 12 8 oz)   04/18/19 56 5 kg (124 lb 9 6 oz)   11/14/18 56 2 kg (124 lb) (42 %, Z= -0 21)*     * Growth percentiles are based on [facility] (Girls, 2-20 Years) data  Physical Exam   Constitutional: She appears well-developed and well-nourished  No distress  HENT:   Head: Normocephalic and atraumatic  Right Ear: Tympanic membrane, external ear and ear canal normal    Left Ear: Tympanic membrane, external ear and ear canal normal    Nose: Mucosal edema (severely swollen bilaterally with thick mucus in nares) present  Right sinus exhibits no maxillary sinus tenderness and no frontal sinus tenderness  Left sinus exhibits no maxillary sinus tenderness and no frontal sinus tenderness  Mouth/Throat: Mucous membranes are normal  Posterior oropharyngeal edema and posterior oropharyngeal erythema present  No oropharyngeal exudate  Tonsils are 2+ on the right  Tonsils are 2+ on the left  No tonsillar exudate  Eyes: Pupils are equal, round, and reactive to light  Conjunctivae and lids are normal    Neck: Trachea normal and normal range of motion  Neck supple  No thyromegaly present  Cardiovascular: Normal rate, regular rhythm and normal heart sounds  No murmur heard  Pulses:       Radial pulses are 2+ on the right side, and 2+ on the left side  Pulmonary/Chest: Effort normal and breath sounds normal  She has no decreased breath sounds  She has no wheezes  She has no rhonchi  She has no rales  Lymphadenopathy:     She has cervical adenopathy (anterior bilaterally)  Neurological: She is alert  Skin: No rash noted  Psychiatric: She has a normal mood and affect  Vitals reviewed            ALLERGIES:  No Known Allergies    Current Medications     Current Outpatient Medications   Medication Sig Dispense Refill    amoxicillin-clavulanate (AUGMENTIN) 875-125 mg per tablet Take 1 tablet by mouth every 12 (twelve) hours for 10 days 20 tablet 0    fluticasone (FLONASE) 50 mcg/act nasal spray 2 sprays into each nostril daily 16 g 0    KELNOR 1/35 1-35 MG-MCG per tablet Take 1 tablet by mouth daily    1     No current facility-administered medications for this visit            Oliver Quinn PA-C  1/10/2020 3:22 PM  [facility]

## 2020-02-25 ENCOUNTER — OFFICE VISIT (OUTPATIENT)
Dept: FAMILY MEDICINE CLINIC | Facility: CLINIC | Age: 20
End: 2020-02-25
Payer: COMMERCIAL

## 2020-02-25 VITALS
HEIGHT: 65 IN | WEIGHT: 124.13 LBS | HEART RATE: 82 BPM | DIASTOLIC BLOOD PRESSURE: 70 MMHG | BODY MASS INDEX: 20.68 KG/M2 | OXYGEN SATURATION: 98 % | SYSTOLIC BLOOD PRESSURE: 102 MMHG

## 2020-02-25 DIAGNOSIS — R59.1 LYMPHADENOPATHY OF HEAD AND NECK: ICD-10-CM

## 2020-02-25 DIAGNOSIS — Z11.4 SCREENING FOR HIV (HUMAN IMMUNODEFICIENCY VIRUS): ICD-10-CM

## 2020-02-25 DIAGNOSIS — Z00.00 ANNUAL PHYSICAL EXAM: Primary | ICD-10-CM

## 2020-02-25 DIAGNOSIS — Z13.220 LIPID SCREENING: ICD-10-CM

## 2020-02-25 DIAGNOSIS — Z13.1 DIABETES MELLITUS SCREENING: ICD-10-CM

## 2020-02-25 PROCEDURE — 99395 PREV VISIT EST AGE 18-39: CPT | Performed by: PHYSICIAN ASSISTANT

## 2020-02-25 RX ORDER — DIPHENOXYLATE HYDROCHLORIDE AND ATROPINE SULFATE 2.5; .025 MG/1; MG/1
1 TABLET ORAL DAILY
COMMUNITY
End: 2021-05-13

## 2020-02-25 NOTE — PATIENT INSTRUCTIONS

## 2020-02-25 NOTE — ASSESSMENT & PLAN NOTE
I reassured the patient that she had a very small lymph node noted in the left lateral neck  She states that has not been changing in size  She was encouraged to monitor the area and call if it seemed to be enlarging  Check CBC with labs as ordered today

## 2020-02-25 NOTE — PROGRESS NOTES
[address] [facility]    NAME: Chayito Ardonog  AGE: 22 y o  SEX: female  : 2000     DATE: 2020     Assessment and Plan:     Problem List Items Addressed This Visit        Immune and Lymphatic    Lymphadenopathy of head and neck     I reassured the patient that she had a very small lymph node noted in the left lateral neck  She states that has not been changing in size  She was encouraged to monitor the area and call if it seemed to be enlarging  Check CBC with labs as ordered today  Relevant Orders    CBC and differential      Other Visit Diagnoses     Annual physical exam    -  Primary    Screening for HIV (human immunodeficiency virus)        Relevant Orders    Human Immunodeficiency Virus 1/2 Antigen / Antibody ( Fourth Generation) with Reflex Testing    Lipid screening        Relevant Orders    Lipid Panel with Direct LDL reflex    Diabetes mellitus screening        Relevant Orders    Comprehensive metabolic panel      The USPSTF recommendation for HIV screening in all patients between 13and 72years old (once in lifetime or annually with risk factors) was discussed with the patient  The patient agreed to testing  Immunizations and preventive care screenings were discussed with patient today  Appropriate education was printed on patient's after visit summary  Counseling:  Sexual health: discussed sexually transmitted diseases, partner selection, use of condoms, avoidance of unintended pregnancy, and contraceptive alternatives  · Exercise: the importance of regular exercise/physical activity was discussed  Recommend exercise 3-5 times per week for at least 30 minutes            Return in about 1 year (around 2021) for Annual physical      Chief Complaint:     Chief Complaint   Patient presents with    Physical Exam      History of Present Illness:     Adult Annual Physical   Patient here for a comprehensive physical exam  The patient reports problems - as below  Diet and Physical Activity  · Diet/Nutrition: well balanced diet and consuming 3-5 servings of fruits/vegetables daily  · Exercise: walks and strength training intermittently  Depression Screening  PHQ-9 Depression Screening    PHQ-9:    Frequency of the following problems over the past two weeks:       Little interest or pleasure in doing things:  0 - not at all  Feeling down, depressed, or hopeless:  0 - not at all  PHQ-2 Score:  0       General Health  · Sleep: sleeps well and gets more than 8 hours of sleep on average  · Hearing: normal - bilateral   · Vision: no vision problems  · Dental: regular dental visits  /GYN Health  · Last menstrual period: 2/24/2020  · Contraceptive method: oral contraceptives  · Sees GYN     Review of Systems:     Review of Systems   Constitutional: Negative for chills and fever  HENT: Negative for rhinorrhea and sore throat  Eyes: Negative for visual disturbance  Respiratory: Negative for cough, shortness of breath and wheezing  Cardiovascular: Negative for chest pain, palpitations and leg swelling  Gastrointestinal: Negative for abdominal pain, blood in stool, constipation, diarrhea, nausea and vomiting  Endocrine: Negative for polydipsia and polyuria  Genitourinary: Negative for dysuria  Musculoskeletal: Negative for arthralgias and myalgias  Skin: Negative for rash  Neurological: Negative for dizziness, syncope and headaches  Hematological: Does not bruise/bleed easily  Psychiatric/Behavioral: Negative for dysphoric mood  The patient is not nervous/anxious         Past Medical History:     Past Medical History:   Diagnosis Date    Headache 11/12/2012    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      Past Surgical History:     Past Surgical History:   Procedure Laterality Date    WISDOM TOOTH EXTRACTION  2018      Social History: Social History     Socioeconomic History    Marital status: Single     Spouse name: None    Number of children: None    Years of education: None    Highest education level: None   Occupational History    None   Social Needs    Financial resource strain: None    Food insecurity:     Worry: None     Inability: None    Transportation needs:     Medical: None     Non-medical: None   Tobacco Use    Smoking status: Never Smoker    Smokeless tobacco: Never Used   Substance and Sexual Activity    Alcohol use: Yes     Frequency: Monthly or less     Drinks per session: 1 or 2    Drug use: Never    Sexual activity: Yes     Partners: Male     Comment: birth control pills   Lifestyle    Physical activity:     Days per week: None     Minutes per session: None    Stress: None   Relationships    Social connections:     Talks on phone: None     Gets together: None     Attends [employer] service: None     Active member of club or organization: None     Attends meetings of clubs or organizations: None     Relationship status: None    Intimate partner violence:     Fear of current or ex partner: None     Emotionally abused: None     Physically abused: None     Forced sexual activity: None   Other Topics Concern    None   Social History Narrative    None      Family History:     Family History   Problem Relation Age of Onset    Other Mother         headache syndromes     Anxiety disorder Father     Diabetes Maternal Grandmother     Ovarian cancer Maternal Grandmother     Other Maternal Grandmother         headache syndromes    Bone cancer Maternal Grandmother     Breast cancer Maternal Aunt     Breast cancer Family       Current Medications:     Current Outpatient Medications   Medication Sig Dispense Refill    fluticasone (FLONASE) 50 mcg/act nasal spray 2 sprays into each nostril daily 16 g 0    KELNOR 1/35 1-35 MG-MCG per tablet Take 1 tablet by mouth daily    1    multivitamin (THERAGRAN) TABS Take 1 tablet by mouth daily       No current facility-administered medications for this visit  Allergies:     No Known Allergies   Physical Exam:     /70 (BP Location: Left arm, Patient Position: Sitting, Cuff Size: Standard)   Pulse 82   Ht 5' 5 1" (1 654 m)   Wt 56 3 kg (124 lb 2 oz)   SpO2 98%   BMI 20 59 kg/m²     Physical Exam   Constitutional: She appears well-developed and well-nourished  No distress  HENT:   Head: Normocephalic and atraumatic  Right Ear: Hearing, tympanic membrane, external ear and ear canal normal    Left Ear: Hearing, tympanic membrane, external ear and ear canal normal    Nose: Nose normal    Mouth/Throat: Oropharynx is clear and moist and mucous membranes are normal    Eyes: Pupils are equal, round, and reactive to light  Conjunctivae and lids are normal    Neck: Trachea normal and normal range of motion  Neck supple  Carotid bruit is not present  No thyroid mass and no thyromegaly present  Cardiovascular: Normal rate, regular rhythm, S1 normal, S2 normal, normal heart sounds and intact distal pulses  No murmur heard  Pulses:       Radial pulses are 2+ on the right side, and 2+ on the left side  Posterior tibial pulses are 2+ on the right side, and 2+ on the left side  Pulmonary/Chest: Effort normal and breath sounds normal  She has no decreased breath sounds  She has no wheezes  She has no rhonchi  She has no rales  Abdominal: Soft  Normal appearance and bowel sounds are normal  She exhibits no distension and no mass  There is no hepatosplenomegaly  There is no tenderness  No hernia  Musculoskeletal: Normal range of motion  She exhibits no edema  Lymphadenopathy:     She has cervical adenopathy (tiny palpable lymph node in the left lateral neck about 2-3 mm in diameter)  Neurological: She is alert  She has normal strength  No sensory deficit (light touch sensation intact and equal in UE and LE bilaterally)  Skin: Skin is warm and dry   No rash noted    Psychiatric: She has a normal mood and affect  Her behavior is normal    Vitals reviewed        Veronica Cantu PA-C   [facility]

## 2020-03-05 ENCOUNTER — CLINICAL SUPPORT (OUTPATIENT)
Dept: FAMILY MEDICINE CLINIC | Facility: CLINIC | Age: 20
End: 2020-03-05
Payer: COMMERCIAL

## 2020-03-05 DIAGNOSIS — Z23 NEED FOR HEPATITIS B VACCINATION: Primary | ICD-10-CM

## 2020-03-05 PROCEDURE — 90471 IMMUNIZATION ADMIN: CPT | Performed by: PHYSICIAN ASSISTANT

## 2020-03-05 PROCEDURE — 90746 HEPB VACCINE 3 DOSE ADULT IM: CPT | Performed by: PHYSICIAN ASSISTANT

## 2020-03-09 DIAGNOSIS — D72.828 OTHER ELEVATED WHITE BLOOD CELL COUNT: Primary | ICD-10-CM

## 2020-05-21 ENCOUNTER — OFFICE VISIT (OUTPATIENT)
Dept: URGENT CARE | Facility: MEDICAL CENTER | Age: 20
End: 2020-05-21
Payer: COMMERCIAL

## 2020-05-21 ENCOUNTER — APPOINTMENT (OUTPATIENT)
Dept: RADIOLOGY | Facility: MEDICAL CENTER | Age: 20
End: 2020-05-21
Payer: COMMERCIAL

## 2020-05-21 VITALS
DIASTOLIC BLOOD PRESSURE: 72 MMHG | TEMPERATURE: 98.2 F | RESPIRATION RATE: 16 BRPM | OXYGEN SATURATION: 98 % | BODY MASS INDEX: 20.83 KG/M2 | HEIGHT: 65 IN | SYSTOLIC BLOOD PRESSURE: 131 MMHG | WEIGHT: 125 LBS | HEART RATE: 88 BPM

## 2020-05-21 DIAGNOSIS — M79.672 LEFT FOOT PAIN: ICD-10-CM

## 2020-05-21 DIAGNOSIS — M79.672 LEFT FOOT PAIN: Primary | ICD-10-CM

## 2020-05-21 PROCEDURE — G0382 LEV 3 HOSP TYPE B ED VISIT: HCPCS | Performed by: FAMILY MEDICINE

## 2020-05-21 PROCEDURE — 99203 OFFICE O/P NEW LOW 30 MIN: CPT | Performed by: FAMILY MEDICINE

## 2020-05-21 PROCEDURE — 99283 EMERGENCY DEPT VISIT LOW MDM: CPT | Performed by: FAMILY MEDICINE

## 2020-05-21 PROCEDURE — 73630 X-RAY EXAM OF FOOT: CPT

## 2020-05-24 DIAGNOSIS — J01.90 ACUTE SINUSITIS, RECURRENCE NOT SPECIFIED, UNSPECIFIED LOCATION: ICD-10-CM

## 2020-05-26 RX ORDER — FLUTICASONE PROPIONATE 50 MCG
2 SPRAY, SUSPENSION (ML) NASAL DAILY
Qty: 16 G | Refills: 0 | Status: SHIPPED | OUTPATIENT
Start: 2020-05-26 | End: 2020-10-31 | Stop reason: SDUPTHER

## 2020-05-26 RX ORDER — FLUTICASONE PROPIONATE 50 MCG
2 SPRAY, SUSPENSION (ML) NASAL DAILY
Qty: 16 G | Refills: 0 | OUTPATIENT
Start: 2020-05-26

## 2020-06-03 ENCOUNTER — OFFICE VISIT (OUTPATIENT)
Dept: PODIATRY | Facility: CLINIC | Age: 20
End: 2020-06-03
Payer: COMMERCIAL

## 2020-06-03 VITALS — WEIGHT: 125 LBS | BODY MASS INDEX: 20.83 KG/M2 | HEIGHT: 65 IN

## 2020-06-03 DIAGNOSIS — M79.672 LEFT FOOT PAIN: ICD-10-CM

## 2020-06-03 DIAGNOSIS — S93.602A SPRAIN OF LEFT FOOT, INITIAL ENCOUNTER: Primary | ICD-10-CM

## 2020-06-03 PROCEDURE — 1036F TOBACCO NON-USER: CPT | Performed by: PODIATRIST

## 2020-06-03 PROCEDURE — 20600 DRAIN/INJ JOINT/BURSA W/O US: CPT | Performed by: PODIATRIST

## 2020-06-03 PROCEDURE — 3008F BODY MASS INDEX DOCD: CPT | Performed by: PODIATRIST

## 2020-06-03 PROCEDURE — 99203 OFFICE O/P NEW LOW 30 MIN: CPT | Performed by: PODIATRIST

## 2020-06-03 RX ORDER — TRIAMCINOLONE ACETONIDE 40 MG/ML
40 INJECTION, SUSPENSION INTRA-ARTICULAR; INTRAMUSCULAR ONCE
Status: COMPLETED | OUTPATIENT
Start: 2020-06-03 | End: 2020-06-03

## 2020-06-03 RX ORDER — LIDOCAINE HYDROCHLORIDE 10 MG/ML
1 INJECTION, SOLUTION INFILTRATION; PERINEURAL ONCE
Status: COMPLETED | OUTPATIENT
Start: 2020-06-03 | End: 2020-06-03

## 2020-06-03 RX ADMIN — LIDOCAINE HYDROCHLORIDE 1 ML: 10 INJECTION, SOLUTION INFILTRATION; PERINEURAL at 09:59

## 2020-06-03 RX ADMIN — TRIAMCINOLONE ACETONIDE 40 MG: 40 INJECTION, SUSPENSION INTRA-ARTICULAR; INTRAMUSCULAR at 10:00

## 2020-08-22 ENCOUNTER — OFFICE VISIT (OUTPATIENT)
Dept: URGENT CARE | Facility: MEDICAL CENTER | Age: 20
End: 2020-08-22
Payer: COMMERCIAL

## 2020-08-22 VITALS
HEART RATE: 64 BPM | TEMPERATURE: 97.7 F | BODY MASS INDEX: 21.34 KG/M2 | SYSTOLIC BLOOD PRESSURE: 128 MMHG | HEIGHT: 64 IN | RESPIRATION RATE: 16 BRPM | OXYGEN SATURATION: 100 % | WEIGHT: 125 LBS | DIASTOLIC BLOOD PRESSURE: 79 MMHG

## 2020-08-22 DIAGNOSIS — Z11.59 SPECIAL SCREENING EXAMINATION FOR UNSPECIFIED VIRAL DISEASE: Primary | ICD-10-CM

## 2020-08-22 DIAGNOSIS — J02.9 PHARYNGITIS, UNSPECIFIED ETIOLOGY: ICD-10-CM

## 2020-08-22 PROCEDURE — G0382 LEV 3 HOSP TYPE B ED VISIT: HCPCS | Performed by: PHYSICIAN ASSISTANT

## 2020-08-22 PROCEDURE — 99213 OFFICE O/P EST LOW 20 MIN: CPT | Performed by: PHYSICIAN ASSISTANT

## 2020-08-22 PROCEDURE — U0003 INFECTIOUS AGENT DETECTION BY NUCLEIC ACID (DNA OR RNA); SEVERE ACUTE RESPIRATORY SYNDROME CORONAVIRUS 2 (SARS-COV-2) (CORONAVIRUS DISEASE [COVID-19]), AMPLIFIED PROBE TECHNIQUE, MAKING USE OF HIGH THROUGHPUT TECHNOLOGIES AS DESCRIBED BY CMS-2020-01-R: HCPCS | Performed by: PHYSICIAN ASSISTANT

## 2020-08-22 PROCEDURE — 99283 EMERGENCY DEPT VISIT LOW MDM: CPT | Performed by: PHYSICIAN ASSISTANT

## 2020-08-22 NOTE — PATIENT INSTRUCTIONS
COVID testing initiated  Results may take up to 5-10 days to return  If you have a [employer] my chart account, you may access test results through that account  If you do not have the [facility] [employer] my chart account, please establish one so you can access your test results  Home quarantine until test results return and are negative  If positive you need to remain quarantined and follow up call with your PCP  You may do ibuprofen or Tylenol as needed for any sore throat, headache, body aches and pains  You may do an over-the-counter decongestant expectorant such as Mucinex D 12 hour 1/2 to 1 tablet twice a day as needed for nasal congestion, postnasal drip, runny nose  Take with full glass of water  You may continue your allergy medicine  Warm saltwater gargles every 1-2 hours as needed for sore throat  If you would develop profound weakness, chest pain, shortness of breath proceed to emergency room for further evaluation otherwise recommend follow-up with your primary care provider in the next 5-7 days if you are not improving  [address]    Your healthcare provider and/or public health staff have evaluated you and have determined that you do not need to remain in the hospital at this time  At this time you can be isolated at home where you will be monitored by staff from your local or state health department  You should carefully follow the prevention and isolation steps below until a healthcare provider or local or state health department says that you can return to your normal activities  Stay home except to get medical care    People who are mildly ill with COVID-19 are able to isolate at home during their illness  You should restrict activities outside your home, except for getting medical care  Do not go to work, school, or public areas  Avoid using public transportation, ride-sharing, or taxis      Separate yourself from other people and animals in your home    People: As much as possible, you should stay in a specific room and away from other people in your home  Also, you should use a separate bathroom, if available  Animals: You should restrict contact with pets and other animals while you are sick with COVID-19, just like you would around other people  Although there have not been reports of pets or other animals becoming sick with COVID-19, it is still recommended that people sick with COVID-19 limit contact with animals until more information is known about the virus  When possible, have another member of your household care for your animals while you are sick  If you are sick with COVID-19, avoid contact with your pet, including petting, snuggling, being kissed or licked, and sharing food  If you must care for your pet or be around animals while you are sick, wash your hands before and after you interact with pets and wear a facemask  See COVID-19 and Animals for more information  Call ahead before visiting your doctor    If you have a medical appointment, call the healthcare provider and tell them that you have or may have COVID-19  This will help the healthcare providers office take steps to keep other people from getting infected or exposed  Wear a facemask    You should wear a facemask when you are around other people (e g , sharing a room or vehicle) or pets and before you enter a healthcare providers office  If you are not able to wear a facemask (for example, because it causes trouble breathing), then people who live with you should not stay in the same room with you, or they should wear a facemask if they enter your room  Cover your coughs and sneezes    Cover your mouth and nose with a tissue when you cough or sneeze  Throw used tissues in a lined trash can   Immediately wash your hands with soap and water for at least 20 seconds or, if soap and water are not available, clean your hands with an alcohol-based hand  that contains at least 60% alcohol  Clean your hands often    Wash your hands often with soap and water for at least 20 seconds, especially after blowing your nose, coughing, or sneezing; going to the bathroom; and before eating or preparing food  If soap and water are not readily available, use an alcohol-based hand  with at least 60% alcohol, covering all surfaces of your hands and rubbing them together until they feel dry  Soap and water are the best option if hands are visibly dirty  Avoid touching your eyes, nose, and mouth with unwashed hands  Avoid sharing personal household items    You should not share dishes, drinking glasses, cups, eating utensils, towels, or bedding with other people or pets in your home  After using these items, they should be washed thoroughly with soap and water  Clean all high-touch surfaces everyday    High touch surfaces include counters, tabletops, doorknobs, bathroom fixtures, toilets, phones, keyboards, tablets, and bedside tables  Also, clean any surfaces that may have blood, stool, or body fluids on them  Use a household cleaning spray or wipe, according to the label instructions  Labels contain instructions for safe and effective use of the cleaning product including precautions you should take when applying the product, such as wearing gloves and making sure you have good ventilation during use of the product  Monitor your symptoms    Seek prompt medical attention if your illness is worsening (e g , difficulty breathing)  Before seeking care, call your healthcare provider and tell them that you have, or are being evaluated for, COVID-19  Put on a facemask before you enter the facility  These steps will help the healthcare providers office to keep other people in the office or waiting room from getting infected or exposed  Ask your healthcare provider to call the local or state health department   Persons who are placed under active monitoring or facilitated self-monitoring should follow instructions provided by their local health department or occupational health professionals, as appropriate  If you have a medical emergency and need to call 911, notify the dispatch personnel that you have, or are being evaluated for COVID-19  If possible, put on a facemask before emergency medical services arrive  Discontinuing home isolation    Patients with confirmed COVID-19 should remain under home isolation precautions until the following conditions are met:   - They have had no fever for at least 24 hours (that is one full day of no fever without the use medicine that reduces fevers)  AND  - other symptoms have improved (for example, when their cough or shortness of breath have improved)  AND  - at least 10 days have passed since their symptoms first appeared  Patients with confirmed COVID-19 should also notify close contacts (including their workplace) and ask that they self-quarantine  Currently, close contact is defined as being within 6 feet for 10 minutes or more from the period 48 hours before symptom onset to the time at which the patient went into isolation  Close contacts of patients diagnosed with COVID-19 should be instructed by the patient to self-quarantine for 14 days from the last time of their last contact with the patient       Source: [employer]

## 2020-08-22 NOTE — LETTER
August 22, 2020     Patient: Antelmo Corbin   YOB: 2000   Date of Visit: 8/22/2020       To Whom it May Concern:    Patient was seen in office today for acute medical ailment  COVID testing initiated  Should not go to work or school until [employer] test results have returned  If negative may return to work and school  If positive should not return to work or school and should follow up with family doctor           Sincerely,          Adrián Cervantes PA-C        CC: No Recipients

## 2020-08-22 NOTE — PROGRESS NOTES
[facility]    NAME: Sam Orlando is a 22 y o  female  : 2000    MRN: [id number]  DATE: 2020  TIME: 7:29 PM    Assessment and Plan   Special screening examination for unspecified viral disease [Z11 59]  1  Special screening examination for unspecified viral disease  Novel Coronavirus (COVID-19), PCR [facility] - Office Collection   2  Pharyngitis, unspecified etiology       Note given for school and note given for work  Patient Instructions   Patient Instructions   COVID testing initiated  Results may take up to 5-10 days to return  If you have a [facility] my chart account, you may access test results through that account  If you do not have the [employer] [facility] chart account, please establish one so you can access your test results  Home quarantine until test results return and are negative  If positive you need to remain quarantined and follow up call with your PCP  You may do ibuprofen or Tylenol as needed for any sore throat, headache, body aches and pains  You may do an over-the-counter decongestant expectorant such as Mucinex D 12 hour 1/2 to 1 tablet twice a day as needed for nasal congestion, postnasal drip, runny nose  Take with full glass of water  You may continue your allergy medicine  Warm saltwater gargles every 1-2 hours as needed for sore throat  If you would develop profound weakness, chest pain, shortness of breath proceed to emergency room for further evaluation otherwise recommend follow-up with your primary care provider in the next 5-7 days if you are not improving  [address]    Your healthcare provider and/or public health staff have evaluated you and have determined that you do not need to remain in the hospital at this time  At this time you can be isolated at home where you will be monitored by staff from your local or state health department   You should carefully follow the prevention and isolation steps below until a healthcare provider or local or [facility] health department says that you can return to your normal activities  Stay home except to get medical care    People who are mildly ill with COVID-19 are able to isolate at home during their illness  You should restrict activities outside your home, except for getting medical care  Do not go to work, school, or public areas  Avoid using public transportation, ride-sharing, or taxis  Separate yourself from other people and animals in your home    People: As much as possible, you should stay in a specific room and away from other people in your home  Also, you should use a separate bathroom, if available  Animals: You should restrict contact with pets and other animals while you are sick with COVID-19, just like you would around other people  Although there have not been reports of pets or other animals becoming sick with COVID-19, it is still recommended that people sick with COVID-19 limit contact with animals until more information is known about the virus  When possible, have another member of your household care for your animals while you are sick  If you are sick with COVID-19, avoid contact with your pet, including petting, snuggling, being kissed or licked, and sharing food  If you must care for your pet or be around animals while you are sick, wash your hands before and after you interact with pets and wear a facemask  See COVID-19 and Animals for more information  Call ahead before visiting your doctor    If you have a medical appointment, call the healthcare provider and tell them that you have or may have COVID-19  This will help the healthcare providers office take steps to keep other people from getting infected or exposed  Wear a facemask    You should wear a facemask when you are around other people (e g , sharing a room or vehicle) or pets and before you enter a healthcare providers office   If you are not able to wear a facemask (for example, because it causes trouble breathing), then people who live with you should not stay in the same room with you, or they should wear a facemask if they enter your room  Cover your coughs and sneezes    Cover your mouth and nose with a tissue when you cough or sneeze  Throw used tissues in a lined trash can  Immediately wash your hands with soap and water for at least 20 seconds or, if soap and water are not available, clean your hands with an alcohol-based hand  that contains at least 60% alcohol  Clean your hands often    Wash your hands often with soap and water for at least 20 seconds, especially after blowing your nose, coughing, or sneezing; going to the bathroom; and before eating or preparing food  If soap and water are not readily available, use an alcohol-based hand  with at least 60% alcohol, covering all surfaces of your hands and rubbing them together until they feel dry  Soap and water are the best option if hands are visibly dirty  Avoid touching your eyes, nose, and mouth with unwashed hands  Avoid sharing personal household items    You should not share dishes, drinking glasses, cups, eating utensils, towels, or bedding with other people or pets in your home  After using these items, they should be washed thoroughly with soap and water  Clean all high-touch surfaces everyday    High touch surfaces include counters, tabletops, doorknobs, bathroom fixtures, toilets, phones, keyboards, tablets, and bedside tables  Also, clean any surfaces that may have blood, stool, or body fluids on them  Use a household cleaning spray or wipe, according to the label instructions  Labels contain instructions for safe and effective use of the cleaning product including precautions you should take when applying the product, such as wearing gloves and making sure you have good ventilation during use of the product      Monitor your symptoms    Seek prompt medical attention if your illness is worsening (e g , difficulty breathing)  Before seeking care, call your healthcare provider and tell them that you have, or are being evaluated for, COVID-19  Put on a facemask before you enter the facility  These steps will help the healthcare providers office to keep other people in the office or waiting room from getting infected or exposed  Ask your healthcare provider to call the local or [facility] health department  Persons who are placed under active monitoring or facilitated self-monitoring should follow instructions provided by their local health department or occupational health professionals, as appropriate  If you have a medical emergency and need to call 911, notify the dispatch personnel that you have, or are being evaluated for COVID-19  If possible, put on a facemask before emergency medical services arrive  Discontinuing home isolation    Patients with confirmed COVID-19 should remain under home isolation precautions until the following conditions are met:   - They have had no fever for at least 24 hours (that is one full day of no fever without the use medicine that reduces fevers)  AND  - other symptoms have improved (for example, when their cough or shortness of breath have improved)  AND  - at least 10 days have passed since their symptoms first appeared  Patients with confirmed COVID-19 should also notify close contacts (including their workplace) and ask that they self-quarantine  Currently, close contact is defined as being within 6 feet for 10 minutes or more from the period 48 hours before symptom onset to the time at which the patient went into isolation  Close contacts of patients diagnosed with COVID-19 should be instructed by the patient to self-quarantine for 14 days from the last time of their last contact with the patient       Source: [employer]              Chief Complaint     Chief Complaint   Patient presents with   [facility] Cold Like Symptoms     Patient relates has been sick for 2 days with runny nose, scratchy throat and body aches for 2 days  Denies fever, cough, chest pain, and shortness of breath  No exposure to anyone positive for COVID  History of Present Illness   Sophia Barba presents to the clinic c/o  76-year-old female with 1-2 day history of runny nose, nasal congestion, scratchy throat  No fever, chills, unusual body aches and pains, chest pain, shortness of breath  No known COVID exposure  No diarrhea, abdominal pain, loss of sense of taste or smell, rashes  Thought sore throat was regarding her allergies and she has been taking her allergy medicine without any relief  She needs note for work as well as school  She starting nursing school  Review of Systems   Review of Systems   Constitutional: Positive for chills and fatigue  Negative for activity change, appetite change, diaphoresis and fever  HENT: Positive for congestion, postnasal drip, rhinorrhea and sore throat  Respiratory: Negative  Cardiovascular: Negative  Gastrointestinal: Negative  Musculoskeletal: Positive for myalgias  Negative for gait problem, joint swelling, neck pain and neck stiffness  Skin: Negative for rash         Current Medications     Long-Term Medications   Medication Sig Dispense Refill    fluticasone (FLONASE) 50 mcg/act nasal spray 2 sprays into each nostril daily 16 g 0    KELNOR 1/35 1-35 MG-MCG per tablet Take 1 tablet by mouth daily    1    multivitamin (THERAGRAN) TABS Take 1 tablet by mouth daily         Current Allergies     Allergies as of 08/22/2020    (No Known Allergies)          The following portions of the patient's history were reviewed and updated as appropriate: allergies, current medications, past family history, past medical history, past social history, past surgical history and problem list   Past Medical History:   Diagnosis Date    Headache 11/12/2012    Impression - [id number] Ita Ohm: improved with avoiding gluten     Past Surgical History:   Procedure Laterality Date    WISDOM TOOTH EXTRACTION  2018     Family History   Problem Relation Age of Onset    Other Mother         headache syndromes     Anxiety disorder Father     Diabetes Maternal Grandmother     Ovarian cancer Maternal Grandmother     Other Maternal Grandmother         headache syndromes    Bone cancer Maternal Grandmother     Breast cancer Maternal Aunt     Breast cancer Family        Objective   /79   Pulse 64   Temp 97 7 °F (36 5 °C) (Tympanic)   Resp 16   Ht 5' 4" (1 626 m)   Wt 56 7 kg (125 lb)   LMP 08/15/2020   SpO2 100%   BMI 21 46 kg/m²   Patient's last menstrual period was 08/15/2020  Physical Exam     Physical Exam  Vitals signs and nursing note reviewed  Constitutional:       General: She is not in acute distress  Appearance: Normal appearance  She is well-developed and normal weight  She is not ill-appearing, toxic-appearing or diaphoretic  HENT:      Head: Normocephalic and atraumatic  Right Ear: Tympanic membrane, ear canal and external ear normal  There is no impacted cerumen  Left Ear: Tympanic membrane, ear canal and external ear normal  There is no impacted cerumen  Nose: Congestion and rhinorrhea present  Mouth/Throat:      Mouth: Mucous membranes are moist       Pharynx: Posterior oropharyngeal erythema present  No oropharyngeal exudate  Eyes:      General: No scleral icterus  Right eye: No discharge  Left eye: No discharge  Extraocular Movements: Extraocular movements intact  Conjunctiva/sclera: Conjunctivae normal       Pupils: Pupils are equal, round, and reactive to light  Neck:      Musculoskeletal: Normal range of motion and neck supple  No neck rigidity or muscular tenderness  Cardiovascular:      Rate and Rhythm: Normal rate and regular rhythm  Heart sounds: Normal heart sounds  No murmur  No friction rub  No gallop  Pulmonary:      Effort: Pulmonary effort is normal  No respiratory distress  Breath sounds: Normal breath sounds  No stridor  No wheezing, rhonchi or rales  Lymphadenopathy:      Cervical: No cervical adenopathy  Skin:     General: Skin is warm and dry  Findings: No rash  Neurological:      Mental Status: She is alert and oriented to person, place, and time     Psychiatric:         Mood and Affect: Mood normal          Behavior: Behavior normal

## 2020-08-24 LAB — SARS-COV-2 RNA SPEC QL NAA+PROBE: NOT DETECTED

## 2020-09-05 ENCOUNTER — OFFICE VISIT (OUTPATIENT)
Dept: URGENT CARE | Facility: MEDICAL CENTER | Age: 20
End: 2020-09-05
Payer: COMMERCIAL

## 2020-09-05 VITALS
RESPIRATION RATE: 18 BRPM | DIASTOLIC BLOOD PRESSURE: 80 MMHG | SYSTOLIC BLOOD PRESSURE: 112 MMHG | TEMPERATURE: 97.8 F | OXYGEN SATURATION: 99 % | HEART RATE: 90 BPM

## 2020-09-05 DIAGNOSIS — J02.9 SORE THROAT: Primary | ICD-10-CM

## 2020-09-05 LAB — S PYO AG THROAT QL: NEGATIVE

## 2020-09-05 PROCEDURE — 99283 EMERGENCY DEPT VISIT LOW MDM: CPT | Performed by: PHYSICIAN ASSISTANT

## 2020-09-05 PROCEDURE — 87070 CULTURE OTHR SPECIMN AEROBIC: CPT | Performed by: PHYSICIAN ASSISTANT

## 2020-09-05 PROCEDURE — 87147 CULTURE TYPE IMMUNOLOGIC: CPT | Performed by: PHYSICIAN ASSISTANT

## 2020-09-05 PROCEDURE — G0382 LEV 3 HOSP TYPE B ED VISIT: HCPCS | Performed by: PHYSICIAN ASSISTANT

## 2020-09-05 PROCEDURE — 99213 OFFICE O/P EST LOW 20 MIN: CPT | Performed by: PHYSICIAN ASSISTANT

## 2020-09-05 PROCEDURE — U0003 INFECTIOUS AGENT DETECTION BY NUCLEIC ACID (DNA OR RNA); SEVERE ACUTE RESPIRATORY SYNDROME CORONAVIRUS 2 (SARS-COV-2) (CORONAVIRUS DISEASE [COVID-19]), AMPLIFIED PROBE TECHNIQUE, MAKING USE OF HIGH THROUGHPUT TECHNOLOGIES AS DESCRIBED BY CMS-2020-01-R: HCPCS | Performed by: PHYSICIAN ASSISTANT

## 2020-09-05 RX ORDER — METHYLPREDNISOLONE 4 MG/1
TABLET ORAL
Qty: 1 EACH | Refills: 0 | Status: SHIPPED | OUTPATIENT
Start: 2020-09-05 | End: 2020-09-05

## 2020-09-05 RX ORDER — LIDOCAINE HYDROCHLORIDE 20 MG/ML
5 SOLUTION OROPHARYNGEAL 4 TIMES DAILY PRN
Qty: 100 ML | Refills: 0 | Status: SHIPPED | OUTPATIENT
Start: 2020-09-05 | End: 2021-05-13

## 2020-09-05 RX ORDER — METHYLPREDNISOLONE 4 MG/1
TABLET ORAL
Qty: 1 EACH | Refills: 0 | Status: SHIPPED | OUTPATIENT
Start: 2020-09-05 | End: 2021-05-13

## 2020-09-05 RX ORDER — LIDOCAINE HYDROCHLORIDE 20 MG/ML
5 SOLUTION OROPHARYNGEAL 4 TIMES DAILY PRN
Qty: 100 ML | Refills: 0 | Status: SHIPPED | OUTPATIENT
Start: 2020-09-05 | End: 2020-09-05

## 2020-09-05 NOTE — PATIENT INSTRUCTIONS
Rapid Strep testing in the office today was negative  Throat culture will be sent out and you will be called if results are positive  At that time antibiotics will be called into your pharmacy  Begin using viscous lidocaine as prescribed  Begin using steroid pack as prescribed  Continue with supportive care: warm salt water gargles, lozenges, warm humidified air, saline nasal spray, plenty of fluids, plenty of rest, and over the counter pain medication as needed  Coronavirus testing done today  Please states self isolated to results return  Results may take 7-10 days  Use over-the-counter pain relief such as Tylenol help with her symptoms  Follow up with PCP in 3-5 days if symptoms do not resolve  Proceed to the ER with worsening throat pain, fever, chills,difficulties breathing, difficulties tolerating oral intake  Pharyngitis   WHAT YOU NEED TO KNOW:   Pharyngitis, or sore throat, is inflammation of the tissues and structures in your pharynx (throat)  Pharyngitis is most often caused by bacteria  It may also be caused by a cold or flu virus  Other causes include smoking, allergies, or acid reflux  DISCHARGE INSTRUCTIONS:   Call 911 for any of the following:   · You have trouble breathing or swallowing because your throat is swollen or sore  Return to the emergency department if:   · You are drooling because it hurts too much to swallow  · Your fever is higher than 102? F (39?C) or lasts longer than 3 days  · You are confused  · You taste blood in your throat  Contact your healthcare provider if:   · Your throat pain gets worse  · You have a painful lump in your throat that does not go away after 5 days  · Your symptoms do not improve after 5 days  · You have questions or concerns about your condition or care  Medicines:  Viral pharyngitis will go away on its own without treatment   Your sore throat should start to feel better in 3 to 5 days for both viral and bacterial infections  You may need any of the following:  · Antibiotics  treat a bacterial infection  · NSAIDs , such as ibuprofen, help decrease swelling, pain, and fever  NSAIDs can cause stomach bleeding or kidney problems in certain people  If you take blood thinner medicine, always ask your healthcare provider if NSAIDs are safe for you  Always read the medicine label and follow directions  · Acetaminophen  decreases pain and fever  It is available without a doctor's order  Ask how much to take and how often to take it  Follow directions  Acetaminophen can cause liver damage if not taken correctly  · Take your medicine as directed  Contact your healthcare provider if you think your medicine is not helping or if you have side effects  Tell him or her if you are allergic to any medicine  Keep a list of the medicines, vitamins, and herbs you take  Include the amounts, and when and why you take them  Bring the list or the pill bottles to follow-up visits  Carry your medicine list with you in case of an emergency  Manage your symptoms:   · Gargle salt water  Mix ¼ teaspoon salt in an 8 ounce glass of warm water and gargle  This may help decrease swelling in your throat  · Drink liquids as directed  You may need to drink more liquids than usual  Liquids may help soothe your throat and prevent dehydration  Ask how much liquid to drink each day and which liquids are best for you  · Use a cool-steam humidifier  to help moisten the air in your room and calm your cough  · Soothe your throat  with cough drops, ice, soft foods, or popsicles  Prevent the spread of pharyngitis:  Cover your mouth and nose when you cough or sneeze  Do not share food or drinks  Wash your hands often  Use soap and water  If soap and water are unavailable, use an alcohol based hand   Follow up with your healthcare provider as directed:  Write down your questions so you remember to ask them during your visits     © 2017 [employer] Information is for End User's use only and may not be sold, redistributed or otherwise used for commercial purposes  All illustrations and images included in CareNotes® are the copyrighted property of [employer] Inc  or [place]  The above information is an  only  It is not intended as medical advice for individual conditions or treatments  Talk to your doctor, nurse or pharmacist before following any medical regimen to see if it is safe and effective for you  [address]    Your healthcare provider and/or public health staff have evaluated you and have determined that you do not need to remain in the hospital at this time  At this time you can be isolated at home where you will be monitored by staff from your local or state health department  You should carefully follow the prevention and isolation steps below until a healthcare provider or local or state health department says that you can return to your normal activities  Stay home except to get medical care    People who are mildly ill with COVID-19 are able to isolate at home during their illness  You should restrict activities outside your home, except for getting medical care  Do not go to work, school, or public areas  Avoid using public transportation, ride-sharing, or taxis  Separate yourself from other people and animals in your home    People: As much as possible, you should stay in a specific room and away from other people in your home  Also, you should use a separate bathroom, if available  Animals: You should restrict contact with pets and other animals while you are sick with COVID-19, just like you would around other people  Although there have not been reports of pets or other animals becoming sick with COVID-19, it is still recommended that people sick with COVID-19 limit contact with animals until more information is known about the virus   When possible, have another member of your household care for your animals while you are sick  If you are sick with COVID-19, avoid contact with your pet, including petting, snuggling, being kissed or licked, and sharing food  If you must care for your pet or be around animals while you are sick, wash your hands before and after you interact with pets and wear a facemask  See COVID-19 and Animals for more information  Call ahead before visiting your doctor    If you have a medical appointment, call the healthcare provider and tell them that you have or may have COVID-19  This will help the healthcare providers office take steps to keep other people from getting infected or exposed  Wear a facemask    You should wear a facemask when you are around other people (e g , sharing a room or vehicle) or pets and before you enter a healthcare providers office  If you are not able to wear a facemask (for example, because it causes trouble breathing), then people who live with you should not stay in the same room with you, or they should wear a facemask if they enter your room  Cover your coughs and sneezes    Cover your mouth and nose with a tissue when you cough or sneeze  Throw used tissues in a lined trash can  Immediately wash your hands with soap and water for at least 20 seconds or, if soap and water are not available, clean your hands with an alcohol-based hand  that contains at least 60% alcohol  Clean your hands often    Wash your hands often with soap and water for at least 20 seconds, especially after blowing your nose, coughing, or sneezing; going to the bathroom; and before eating or preparing food  If soap and water are not readily available, use an alcohol-based hand  with at least 60% alcohol, covering all surfaces of your hands and rubbing them together until they feel dry  Soap and water are the best option if hands are visibly dirty   Avoid touching your eyes, nose, and mouth with unwashed hands  Avoid sharing personal household items    You should not share dishes, drinking glasses, cups, eating utensils, towels, or bedding with other people or pets in your home  After using these items, they should be washed thoroughly with soap and water  Clean all high-touch surfaces everyday    High touch surfaces include counters, tabletops, doorknobs, bathroom fixtures, toilets, phones, keyboards, tablets, and bedside tables  Also, clean any surfaces that may have blood, stool, or body fluids on them  Use a household cleaning spray or wipe, according to the label instructions  Labels contain instructions for safe and effective use of the cleaning product including precautions you should take when applying the product, such as wearing gloves and making sure you have good ventilation during use of the product  Monitor your symptoms    Seek prompt medical attention if your illness is worsening (e g , difficulty breathing)  Before seeking care, call your healthcare provider and tell them that you have, or are being evaluated for, COVID-19  Put on a facemask before you enter the facility  These steps will help the healthcare providers office to keep other people in the office or waiting room from getting infected or exposed  Ask your healthcare provider to call the local or [facility] health department  Persons who are placed under active monitoring or facilitated self-monitoring should follow instructions provided by their local health department or occupational health professionals, as appropriate  If you have a medical emergency and need to call 911, notify the dispatch personnel that you have, or are being evaluated for COVID-19  If possible, put on a facemask before emergency medical services arrive      Discontinuing home isolation    Patients with confirmed COVID-19 should remain under home isolation precautions until the following conditions are met:   - They have had no fever for at least 24 hours (that is one full day of no fever without the use medicine that reduces fevers)  AND  - other symptoms have improved (for example, when their cough or shortness of breath have improved)  AND  - at least 10 days have passed since their symptoms first appeared  Patients with confirmed COVID-19 should also notify close contacts (including their workplace) and ask that they self-quarantine  Currently, close contact is defined as being within 6 feet for 10 minutes or more from the period 48 hours before symptom onset to the time at which the patient went into isolation  Close contacts of patients diagnosed with COVID-19 should be instructed by the patient to self-quarantine for 14 days from the last time of their last contact with the patient       Source: [employer]

## 2020-09-05 NOTE — LETTER
September 5, 2020     Patient: Ricky Myers   YOB: 2000   Date of Visit: 9/5/2020       To Whom it May Concern:    Ricky Myers was seen in my clinic on 9/5/2020  She may return to work after negative testing results  If you have any questions or concerns, please don't hesitate to call           Sincerely,          Diaz Delarosa PA-C        CC: No Recipients

## 2020-09-05 NOTE — PROGRESS NOTES
[facility]        NAME: Kevin Jenkins is a 22 y o  female  : 2000    MRN: [id number]  DATE: 2020  TIME: 1:50 PM    Assessment and Plan   Sore throat [J02 9]  1  Sore throat  Novel Coronavirus (COVID-19), PCR [facility] - Office Collection    POCT rapid strepA    Throat culture    Lidocaine Viscous HCl (XYLOCAINE) 2 % mucosal solution    methylPREDNISolone 4 MG tablet therapy pack    DISCONTINUED: Lidocaine Viscous HCl (XYLOCAINE) 2 % mucosal solution    DISCONTINUED: Lidocaine Viscous HCl (XYLOCAINE) 2 % mucosal solution    DISCONTINUED: methylPREDNISolone 4 MG tablet therapy pack         Patient Instructions   Rapid Strep testing in the office today was negative  Throat culture will be sent out and you will be called if results are positive  At that time antibiotics will be called into your pharmacy  Begin using viscous lidocaine as prescribed  Begin using steroid pack as prescribed  Continue with supportive care: warm salt water gargles, lozenges, warm humidified air, saline nasal spray, plenty of fluids, plenty of rest, and over the counter pain medication as needed  Coronavirus testing done today  Please states self isolated to results return  Results may take 7-10 days  Use over-the-counter pain relief such as Tylenol help with her symptoms  Follow up with PCP in 3-5 days if symptoms do not resolve  Proceed to the ER with worsening throat pain, fever, chills,difficulties breathing, difficulties tolerating oral intake  Follow up with PCP in 3-5 days  Proceed to  ER if symptoms worsen  Chief Complaint     Chief Complaint   Patient presents with    Sore Throat     Patient presents with a sore throat and fatigue for 2 days  She also reports chills, headache  She denies SOB, N/V/D and change in taste or smell            History of Present Illness       22year old female no significant past medical history presents to the office for sore throat fatigue, chills and generalized myalgias x2 days  Patient denies any sick contacts or contact with coronavirus as far she is aware  Patient notes that she is a  at [employer] and does have contact with a number of different people  Patient denies any respiratory related conditions such as asthma or COPD  She denies any recent travel  She denies smoking history  Patient notes some mild difficulty with eating and drinking secondary to pain  Thus far she has use over-the-counter cold and flu relief to help with her symptoms which she does help mildly  Patient notes that she has had strep throat before in the past and that this feels similar  She denies any rashes, abdominal pain, abdominal distension, changes in bowel or bladder habits  Sore Throat    This is a new problem  Episode onset: x 2  The problem has been gradually worsening  The pain is worse on the right side  There has been no fever  The pain is at a severity of 6/10  Associated symptoms include ear pain ("pressure" ), headaches (resolved ), swollen glands and trouble swallowing  Pertinent negatives include no abdominal pain, congestion, coughing, diarrhea, shortness of breath or vomiting  Exposure to: no known sick contacts  Treatments tried: cold and flu medication and mucinex  The treatment provided mild relief  Review of Systems   Review of Systems   Constitutional: Positive for chills, fatigue and fever (subjective )  HENT: Positive for ear pain ("pressure" ), sore throat and trouble swallowing  Negative for congestion, postnasal drip, rhinorrhea, sinus pressure and sinus pain  Respiratory: Negative for cough and shortness of breath  Cardiovascular: Negative for chest pain and palpitations  Gastrointestinal: Negative  Negative for abdominal distention, abdominal pain, diarrhea, nausea and vomiting  Genitourinary: Negative  Musculoskeletal: Positive for myalgias  Skin: Negative for rash     Neurological: Positive for headaches (resolved )  Negative for dizziness, light-headedness and numbness  Current Medications       Current Outpatient Medications:     fluticasone (FLONASE) 50 mcg/act nasal spray, 2 sprays into each nostril daily, Disp: 16 g, Rfl: 0    KELNOR 1/35 1-35 MG-MCG per tablet, Take 1 tablet by mouth daily  , Disp: , Rfl: 1    multivitamin (THERAGRAN) TABS, Take 1 tablet by mouth daily, Disp: , Rfl:     Lidocaine Viscous HCl (XYLOCAINE) 2 % mucosal solution, Swish and spit 5 mL 4 (four) times a day as needed for mouth pain or discomfort or mucositis, Disp: 100 mL, Rfl: 0    methylPREDNISolone 4 MG tablet therapy pack, Use as directed on package, Disp: 1 each, Rfl: 0    Current Allergies     Allergies as of 09/05/2020    (No Known Allergies)            The following portions of the patient's history were reviewed and updated as appropriate: allergies, current medications, past family history, past medical history, past social history, past surgical history and problem list      Past Medical History:   Diagnosis Date    Headache 11/12/2012    Impression - [id number] Fidel Sandoval: improved with avoiding gluten       Past Surgical History:   Procedure Laterality Date    WISDOM TOOTH EXTRACTION  2018       Family History   Problem Relation Age of Onset    Other Mother         headache syndromes     Anxiety disorder Father     Diabetes Maternal Grandmother     Ovarian cancer Maternal Grandmother     Other Maternal Grandmother         headache syndromes    Bone cancer Maternal Grandmother     Breast cancer Maternal Aunt     Breast cancer Family          Medications have been verified  Objective   /80   Pulse 90   Temp 97 8 °F (36 6 °C)   Resp 18   LMP 08/15/2020   SpO2 99%        Physical Exam     Physical Exam  Constitutional:       General: She is not in acute distress  Appearance: She is well-developed  She is not ill-appearing or diaphoretic     HENT:      Head: Normocephalic and atraumatic  Right Ear: Hearing, ear canal and external ear normal  A middle ear effusion is present  Left Ear: Hearing, ear canal and external ear normal  A middle ear effusion is present  Nose: Mucosal edema present  No rhinorrhea  Mouth/Throat:      Pharynx: Uvula midline  Posterior oropharyngeal erythema present  No pharyngeal swelling, oropharyngeal exudate or uvula swelling  Tonsils: No tonsillar exudate or tonsillar abscesses  2+ on the right  2+ on the left  Eyes:      General: Lids are normal       Conjunctiva/sclera: Conjunctivae normal       Pupils: Pupils are equal, round, and reactive to light  Neck:      Musculoskeletal: Neck supple  Cardiovascular:      Rate and Rhythm: Normal rate and regular rhythm  Heart sounds: Normal heart sounds, S1 normal and S2 normal  No murmur  Pulmonary:      Effort: Pulmonary effort is normal  No respiratory distress  Breath sounds: Normal breath sounds  No stridor  No decreased breath sounds, wheezing or rales  Abdominal:      General: Bowel sounds are normal       Palpations: Abdomen is soft  Tenderness: There is no abdominal tenderness  Lymphadenopathy:      Cervical: No cervical adenopathy  Right cervical: No superficial or deep cervical adenopathy  Left cervical: No superficial or deep cervical adenopathy  Skin:     General: Skin is warm and dry  Findings: No rash  Neurological:      Mental Status: She is alert  Psychiatric:         Behavior: Behavior is cooperative

## 2020-09-06 LAB — BACTERIA THROAT CULT: ABNORMAL

## 2020-09-07 ENCOUNTER — TELEPHONE (OUTPATIENT)
Dept: URGENT CARE | Facility: MEDICAL CENTER | Age: 20
End: 2020-09-07

## 2020-09-07 DIAGNOSIS — J02.0 ACUTE STREPTOCOCCAL PHARYNGITIS: Primary | ICD-10-CM

## 2020-09-07 LAB — SARS-COV-2 RNA SPEC QL NAA+PROBE: NOT DETECTED

## 2020-09-07 RX ORDER — AMOXICILLIN 500 MG/1
500 TABLET, FILM COATED ORAL 2 TIMES DAILY
Qty: 20 TABLET | Refills: 0 | Status: SHIPPED | OUTPATIENT
Start: 2020-09-07 | End: 2020-09-17

## 2020-09-07 NOTE — TELEPHONE ENCOUNTER
Patient's name and date of birth were confirmed  Patient educated that coronavirus testing results were negative  Patient also educated that her throat culture grew out strep G  Patient remains symptomatic  Antibiotics will be sent into the pharmacy for her at this time  Patient denies any known drug allergies  Patient educated to take with probiotic or yogurt to prevent stomach upset  All questions answered to satisfaction    Patient is agreeable to treatment plan

## 2020-10-08 ENCOUNTER — TELEPHONE (OUTPATIENT)
Dept: FAMILY MEDICINE CLINIC | Facility: CLINIC | Age: 20
End: 2020-10-08

## 2020-10-08 DIAGNOSIS — Z01.84 IMMUNITY STATUS TESTING: Primary | ICD-10-CM

## 2020-10-19 LAB
GAMMA INTERFERON BACKGROUND BLD IA-ACNC: 0.23 IU/ML
HBV SURFACE AB SER QL: REACTIVE
HBV SURFACE AG SERPL QL IA: NEGATIVE
M TB IFN-G CD4+ T-CELLS BLD-ACNC: 0.48 IU/ML
M TB IFN-G CD4+ T-CELLS BLD-ACNC: 0.52 IU/ML
MITOGEN IGNF BLD-ACNC: >10 IU/ML
QUANTIFERON INCUBATION COMMENT: NORMAL
QUANTIFERON-TB GOLD PLUS: NEGATIVE
SERVICE CMNT-IMP: NORMAL

## 2020-10-31 DIAGNOSIS — J01.90 ACUTE SINUSITIS, RECURRENCE NOT SPECIFIED, UNSPECIFIED LOCATION: ICD-10-CM

## 2020-11-03 RX ORDER — FLUTICASONE PROPIONATE 50 MCG
2 SPRAY, SUSPENSION (ML) NASAL DAILY
Qty: 16 G | Refills: 1 | Status: SHIPPED | OUTPATIENT
Start: 2020-11-03 | End: 2022-05-12 | Stop reason: SDUPTHER

## 2020-12-09 ENCOUNTER — OFFICE VISIT (OUTPATIENT)
Dept: PODIATRY | Facility: CLINIC | Age: 20
End: 2020-12-09
Payer: COMMERCIAL

## 2020-12-09 VITALS — BODY MASS INDEX: 21.34 KG/M2 | WEIGHT: 125 LBS | HEIGHT: 64 IN

## 2020-12-09 DIAGNOSIS — M67.472 GANGLION CYST OF LEFT FOOT: ICD-10-CM

## 2020-12-09 DIAGNOSIS — M79.672 LEFT FOOT PAIN: Primary | ICD-10-CM

## 2020-12-09 DIAGNOSIS — M77.52 BONE SPUR OF LEFT FOOT: ICD-10-CM

## 2020-12-09 PROCEDURE — 99213 OFFICE O/P EST LOW 20 MIN: CPT | Performed by: PODIATRIST

## 2020-12-09 PROCEDURE — 1036F TOBACCO NON-USER: CPT | Performed by: PODIATRIST

## 2020-12-09 PROCEDURE — 3008F BODY MASS INDEX DOCD: CPT | Performed by: PODIATRIST

## 2021-05-13 ENCOUNTER — OFFICE VISIT (OUTPATIENT)
Dept: FAMILY MEDICINE CLINIC | Facility: CLINIC | Age: 21
End: 2021-05-13
Payer: COMMERCIAL

## 2021-05-13 VITALS
HEART RATE: 94 BPM | RESPIRATION RATE: 18 BRPM | SYSTOLIC BLOOD PRESSURE: 118 MMHG | HEIGHT: 64 IN | BODY MASS INDEX: 27.66 KG/M2 | OXYGEN SATURATION: 100 % | WEIGHT: 162 LBS | DIASTOLIC BLOOD PRESSURE: 74 MMHG | TEMPERATURE: 97.1 F

## 2021-05-13 DIAGNOSIS — J01.10 ACUTE NON-RECURRENT FRONTAL SINUSITIS: Primary | ICD-10-CM

## 2021-05-13 PROCEDURE — 1036F TOBACCO NON-USER: CPT | Performed by: FAMILY MEDICINE

## 2021-05-13 PROCEDURE — 3725F SCREEN DEPRESSION PERFORMED: CPT | Performed by: FAMILY MEDICINE

## 2021-05-13 PROCEDURE — 3008F BODY MASS INDEX DOCD: CPT | Performed by: FAMILY MEDICINE

## 2021-05-13 PROCEDURE — 99214 OFFICE O/P EST MOD 30 MIN: CPT | Performed by: FAMILY MEDICINE

## 2021-05-13 RX ORDER — FAMOTIDINE 20 MG/1
20 TABLET, FILM COATED ORAL 2 TIMES DAILY
COMMUNITY
Start: 2021-04-23 | End: 2021-11-05

## 2021-05-13 RX ORDER — AMOXICILLIN 500 MG/1
500 CAPSULE ORAL EVERY 8 HOURS SCHEDULED
Qty: 30 CAPSULE | Refills: 0 | Status: SHIPPED | OUTPATIENT
Start: 2021-05-13 | End: 2021-05-23

## 2021-05-13 RX ORDER — ASPIRIN 81 MG/1
81 TABLET, CHEWABLE ORAL DAILY
COMMUNITY
Start: 2021-03-02 | End: 2021-11-05

## 2021-05-13 NOTE — PROGRESS NOTES
FAMILY PRACTICE OFFICE VISIT    NAME: Lui Gold    AGE: 23 y o  SEX: female  : 2000   MRN: [id number]    DATE: 2021  TIME: 10:26 AM    Assessment and Plan     1  Acute non-recurrent frontal sinusitis  27 weeks gestation  Antibiotic 3x/day for 10 days with food  Eat yogurt (ie: activia) - while on antibiotic  Recommendation to increase fluids - particularly water, rest,  and humidified air  nedi pot is a good idea  vicks vapor rub to chest and under nose  Elevate upper body while sleeping    flonase  Plain robitussin or mucinex as needed for coughing  Call if not improving/worsening  Advise covid vaccine when able to get    - amoxicillin (AMOXIL) 500 mg capsule; Take 1 capsule (500 mg total) by mouth every 8 (eight) hours for 10 days Take with food  Dispense: 30 capsule; Refill: 0        Chief Complaint   No chief complaint on file  History of Present Illness   Lui Gold is a 23y o -year-old female who presents today with 3-4 days of not feeling well  Initially - pt thought she was having allergies - runny nose  Took claritin without relief as well as mucinex  Now has a sore throat and post-nasal drip  Coughing  Working at [employer] and working on nursing degree at [employer]  Pt is currently 27 weeks pregnant    Review of Systems   Review of Systems   Constitutional: Negative for fever  HENT: Positive for congestion, sinus pressure, sinus pain and sore throat  Respiratory: Positive for cough  Negative for shortness of breath and wheezing  Cough is nonproductive     Cardiovascular: Negative for chest pain, palpitations and leg swelling  Gastrointestinal: Negative for diarrhea, nausea and vomiting  Genitourinary:        Currently 27 weeks gestation   Skin: Negative for rash  Allergic/Immunologic: Positive for environmental allergies  Neurological: Positive for headaches     Hematological:        No h/o DVT         Active Problem List     Patient Active Problem List   Diagnosis    Allergic rhinitis    Family history of breast cancer    Family history of ovarian cancer    Surveillance for birth control, oral contraceptives    Lymphadenopathy of head and neck         Past Medical History:  Past Medical History:   Diagnosis Date    Headache 11/12/2012    Impression - [id number] Eddi Purl: improved with avoiding gluten       Past Surgical History:  Past Surgical History:   Procedure Laterality Date    WISDOM TOOTH EXTRACTION  2018       Family History:  Family History   Problem Relation Age of Onset    Other Mother         headache syndromes     Anxiety disorder Father     Diabetes Maternal Grandmother     Ovarian cancer Maternal Grandmother     Other Maternal Grandmother         headache syndromes    Bone cancer Maternal Grandmother     Breast cancer Maternal Aunt     Breast cancer Family        Social History:  Social History     Socioeconomic History    Marital status: Single     Spouse name: Not on file    Number of children: Not on file    Years of education: Not on file    Highest education level: Not on file   Occupational History    Not on file   Social Needs    Financial resource strain: Not on file    Food insecurity     Worry: Not on file     Inability: Not on file    Transportation needs     Medical: Not on file     Non-medical: Not on file   Tobacco Use    Smoking status: Never Smoker    Smokeless tobacco: Never Used   Substance and Sexual Activity    Alcohol use: Yes     Frequency: Monthly or less     Drinks per session: 1 or 2    Drug use: Never    Sexual activity: Yes     Partners: Male     Comment: birth control pills   Lifestyle    Physical activity     Days per week: Not on file     Minutes per session: Not on file    Stress: Not on file   Relationships    Social connections     Talks on phone: Not on file     Gets together: Not on file     Attends [employer] service: Not on file     Active member of club or organization: Not on file     Attends meetings of clubs or organizations: Not on file     Relationship status: Not on file    Intimate partner violence     Fear of current or ex partner: Not on file     Emotionally abused: Not on file     Physically abused: Not on file     Forced sexual activity: Not on file   Other Topics Concern    Not on file   Social History Narrative    Not on file       Objective     Vitals:    05/13/21 1002   BP: 118/74   Pulse: 94   Resp: 18   Temp: (!) 97 1 °F (36 2 °C)   SpO2: 100%     Wt Readings from Last 3 Encounters:   05/13/21 73 5 kg (162 lb)   12/09/20 56 7 kg (125 lb)   08/22/20 56 7 kg (125 lb)       Physical Exam  Vitals signs and nursing note reviewed  Constitutional:       General: She is not in acute distress  Appearance: Normal appearance  She is not ill-appearing or toxic-appearing  Comments: Pt sounds very nasal    HENT:      Nose:      Comments: Nares grossly congested and erythematous  Post-nasal drip noted  Oropharyngeal reddness noted  But not tonsillar enlargement or exudates    Eyes:      General: No scleral icterus  Cardiovascular:      Rate and Rhythm: Normal rate and regular rhythm  Pulses: Normal pulses  Heart sounds: Normal heart sounds  No murmur  Pulmonary:      Effort: Pulmonary effort is normal  No respiratory distress  Breath sounds: Normal breath sounds  No stridor  No wheezing, rhonchi or rales  Comments: No use of accessory respiratory muscles  Conversing normally without shortness of breath or coughing      Musculoskeletal:      Right lower leg: No edema  Left lower leg: No edema  Lymphadenopathy:      Cervical: No cervical adenopathy  Neurological:      Mental Status: She is alert  Psychiatric:         Mood and Affect: Mood normal          Behavior: Behavior normal          Thought Content:  Thought content normal          Judgment: Judgment normal          Pertinent Laboratory/Diagnostic Studies:  Lab Results   Component Value Date    BUN 9 03/18/2018    CREATININE 0 77 03/18/2018    CALCIUM 8 7 03/18/2018    K 3 6 03/18/2018    CO2 25 03/18/2018     (H) 03/18/2018     Lab Results   Component Value Date    ALT 13 03/18/2018    AST 15 03/18/2018    ALKPHOS 49 03/18/2018       Lab Results   Component Value Date    WBC 9 05 03/18/2018    HGB 12 6 03/18/2018    HCT 36 8 03/18/2018    MCV 91 03/18/2018     03/18/2018       No results found for: TSH    No results found for: CHOL  No results found for: TRIG  No results found for: HDL  No results found for: LDLCALC  No results found for: HGBA1C    Results for orders placed or performed in visit on 10/15/20   Quantiferon TB ([facility])   Result Value Ref Range    Quantiferon Incubation Comment Incubation performed  Quantiferon-TB Gold Plus Negative Negative   Quantiferon TB Gold Plus ([facility])   Result Value Ref Range    QuantiFERON Criteria Comment     LC QFT TB1-NIL 0 52 IU/mL    LC QFT TB2-NIL 0 48 IU/mL    LC QFT NIL 0 23 IU/mL    LC QFT MITOGEN-NIL >10 00 IU/mL   Hep B Surface Ab, Ql   Result Value Ref Range    Hepatitis B Surface Ab Qual Reactive    Hepatitis B surface antigen   Result Value Ref Range    HBsAg Screen Negative Negative       No orders of the defined types were placed in this encounter  ALLERGIES:  No Known Allergies    Current Medications     Current Outpatient Medications   Medication Sig Dispense Refill    aspirin 81 mg chewable tablet Chew 81 mg daily      famotidine (PEPCID) 20 mg tablet Take 20 mg by mouth 2 (two) times a day      fluticasone (FLONASE) 50 mcg/act nasal spray 2 sprays into each nostril daily 16 g 1    Prenatal Vit-Fe Fumarate-FA (PRENATAL VITAMINS PO) Take by mouth       No current facility-administered medications for this visit            Health Maintenance     Health Maintenance   Topic Date Due    COVID-19 Vaccine (1) Never done    HIV Screening  Never done    Chlamydia Screening  Never done    BMI: Followup Plan  Never done    Cervical Cancer Screening  Never done    DTaP,Tdap,and Td Vaccines (7 - Td) 02/07/2021    Annual Physical  02/25/2021    Depression Screening PHQ  05/13/2022    BMI: Adult  05/13/2022    HIB Vaccine  Completed    Hepatitis B Vaccine  Completed    IPV Vaccine  Completed    Hepatitis A Vaccine  Completed    Meningococcal ACWY Vaccine  Completed    Influenza Vaccine  Completed    HPV Vaccine  Completed    Pneumococcal Vaccine: Pediatrics (0 to 5 Years) and At-Risk Patients (6 to 59 Years)  Aged [employer] History   Administered Date(s) Administered    DTP 02/19/1999, 06/07/2000    DTP / HiB 05/11/1999, 07/15/1999    DTaP 5 02/19/1999, 05/11/1999, 07/15/1999, 01/16/2002, 04/04/2003    HPV Quadrivalent 01/25/2011, 11/14/2011, 11/13/2012    Hep A, adult 06/19/2017, 12/21/2017    Hep B, Adolescent or Pediatric 1998, 03/09/1999, 07/15/1999    Hep B, adult 03/09/1999, 07/15/1999, 01/16/2002, 09/05/2019, 10/18/2019, 03/05/2020    Hepatitis A 06/19/2017    HiB 03/09/1999, 01/27/2000    Hib (PRP-OMP) 03/09/1999, 07/15/1999, 01/16/2002    INFLUENZA 11/02/2015, 12/21/2017    IPV 03/09/1999, 05/11/1999, 06/07/2000, 01/16/2002, 04/21/2004    Influenza Quadrivalent Preservative Free 3 years and older IM 12/21/2017, 10/01/2020    Influenza Quadrivalent, 6-35 Months IM 11/02/2015    Influenza, injectable, quadrivalent, preservative free 0 5 mL 11/14/2018, 10/18/2019    Influenza, seasonal, injectable 11/14/2011, 11/13/2012, 12/23/2013    MMR 01/27/2000, 01/16/2002, 04/04/2003, 08/06/2019    Meningococcal B, Recombinant (Newt Ang) 06/19/2017, 12/21/2017    Meningococcal MCV4P 11/02/2015, 12/16/2016    Meningococcal, Unknown Serogroups 01/25/2011, 11/02/2015, 12/16/2016    OPV 03/09/1999, 05/11/1999    Tdap 02/07/2011    Tuberculin Skin Test-PPD Intradermal 07/25/2019, 08/06/2019    Varicella 01/27/2000, 01/16/2002, 11/02/2008          Angeles Bingham, DO

## 2021-05-13 NOTE — PATIENT INSTRUCTIONS
Antibiotic 3x/day for 10 days with food  Eat yogurt (ie: activia) - while on antibiotic    Recommendation to increase fluids - particularly water, rest,  and humidified air  nedi pot is a good idea  vicks vapor rub to chest and under nose  Elevate upper body while sleeping  flonase  Plain robitussin or mucinex as needed for coughing  Call if not improving/worsening  Advise covid vaccine when able to get

## 2021-05-13 NOTE — LETTER
May 13, 2021     Patient: Karime Troncoso   YOB: 2000   Date of Visit: 5/13/2021       To Whom it May Concern:    Karime Troncoso is under my professional care  She was seen in my office on 5/13/2021  She may return to work on 5/14/21  If you have any questions or concerns, please don't hesitate to call           Sincerely,          Nica Tay DO        CC: No Recipients

## 2021-07-20 LAB — EXTERNAL CHLAMYDIA RESULT: NOT DETECTED

## 2021-11-05 ENCOUNTER — OFFICE VISIT (OUTPATIENT)
Dept: FAMILY MEDICINE CLINIC | Facility: CLINIC | Age: 21
End: 2021-11-05
Payer: COMMERCIAL

## 2021-11-05 VITALS
TEMPERATURE: 97.6 F | HEIGHT: 64 IN | WEIGHT: 141 LBS | SYSTOLIC BLOOD PRESSURE: 98 MMHG | OXYGEN SATURATION: 98 % | BODY MASS INDEX: 24.07 KG/M2 | DIASTOLIC BLOOD PRESSURE: 70 MMHG | HEART RATE: 71 BPM

## 2021-11-05 DIAGNOSIS — Z23 NEED FOR INFLUENZA VACCINATION: ICD-10-CM

## 2021-11-05 DIAGNOSIS — J30.9 ALLERGIC RHINITIS, UNSPECIFIED SEASONALITY, UNSPECIFIED TRIGGER: ICD-10-CM

## 2021-11-05 DIAGNOSIS — Z00.00 ANNUAL PHYSICAL EXAM: Primary | ICD-10-CM

## 2021-11-05 DIAGNOSIS — L85.8 KERATOSIS PILARIS: ICD-10-CM

## 2021-11-05 DIAGNOSIS — Z11.59 NEED FOR HEPATITIS C SCREENING TEST: ICD-10-CM

## 2021-11-05 DIAGNOSIS — K90.49 GASTROINTESTINAL INTOLERANCE TO FOODS: ICD-10-CM

## 2021-11-05 DIAGNOSIS — Z11.4 SCREENING FOR HIV (HUMAN IMMUNODEFICIENCY VIRUS): ICD-10-CM

## 2021-11-05 PROCEDURE — 99395 PREV VISIT EST AGE 18-39: CPT | Performed by: PHYSICIAN ASSISTANT

## 2021-11-05 PROCEDURE — 90686 IIV4 VACC NO PRSV 0.5 ML IM: CPT

## 2021-11-05 PROCEDURE — 90471 IMMUNIZATION ADMIN: CPT

## 2021-11-05 RX ORDER — AMMONIUM LACTATE 12 G/100G
CREAM TOPICAL AS NEEDED
Qty: 385 G | Refills: 1 | Status: SHIPPED | OUTPATIENT
Start: 2021-11-05

## 2021-11-07 PROBLEM — U07.1 COVID-19 AFFECTING PREGNANCY, ANTEPARTUM: Status: ACTIVE | Noted: 2021-02-16

## 2021-11-07 PROBLEM — O26.892 HEARTBURN DURING PREGNANCY IN SECOND TRIMESTER: Status: ACTIVE | Noted: 2021-04-25

## 2021-11-07 PROBLEM — R12 HEARTBURN DURING PREGNANCY IN SECOND TRIMESTER: Status: RESOLVED | Noted: 2021-04-25 | Resolved: 2021-11-07

## 2021-11-07 PROBLEM — O41.8X10 SUBCHORIONIC HEMORRHAGE IN FIRST TRIMESTER: Status: RESOLVED | Noted: 2021-01-13 | Resolved: 2021-11-07

## 2021-11-07 PROBLEM — O99.013 ANEMIA, ANTEPARTUM, THIRD TRIMESTER: Status: ACTIVE | Noted: 2021-05-19

## 2021-11-07 PROBLEM — O13.9 GESTATIONAL HTN: Status: ACTIVE | Noted: 2021-08-11

## 2021-11-07 PROBLEM — O46.8X1 SUBCHORIONIC HEMORRHAGE IN FIRST TRIMESTER: Status: RESOLVED | Noted: 2021-01-13 | Resolved: 2021-11-07

## 2021-11-07 PROBLEM — O99.013 ANEMIA, ANTEPARTUM, THIRD TRIMESTER: Status: RESOLVED | Noted: 2021-05-19 | Resolved: 2021-11-07

## 2021-11-07 PROBLEM — L85.8 KERATOSIS PILARIS: Status: ACTIVE | Noted: 2021-11-07

## 2021-11-07 PROBLEM — O13.9 GESTATIONAL HTN: Status: RESOLVED | Noted: 2021-08-11 | Resolved: 2021-11-07

## 2021-11-07 PROBLEM — O41.8X10 SUBCHORIONIC HEMORRHAGE IN FIRST TRIMESTER: Status: ACTIVE | Noted: 2021-01-13

## 2021-11-07 PROBLEM — O46.8X1 SUBCHORIONIC HEMORRHAGE IN FIRST TRIMESTER: Status: ACTIVE | Noted: 2021-01-13

## 2021-11-07 PROBLEM — B95.1 POSITIVE GBS TEST: Status: ACTIVE | Noted: 2021-07-25

## 2021-11-07 PROBLEM — B95.1 POSITIVE GBS TEST: Status: RESOLVED | Noted: 2021-07-25 | Resolved: 2021-11-07

## 2021-11-07 PROBLEM — U07.1 COVID-19 AFFECTING PREGNANCY, ANTEPARTUM: Status: RESOLVED | Noted: 2021-02-16 | Resolved: 2021-11-07

## 2021-11-07 PROBLEM — O26.892 HEARTBURN DURING PREGNANCY IN SECOND TRIMESTER: Status: RESOLVED | Noted: 2021-04-25 | Resolved: 2021-11-07

## 2021-11-07 PROBLEM — O98.519 COVID-19 AFFECTING PREGNANCY, ANTEPARTUM: Status: RESOLVED | Noted: 2021-02-16 | Resolved: 2021-11-07

## 2021-11-07 PROBLEM — O98.519 COVID-19 AFFECTING PREGNANCY, ANTEPARTUM: Status: ACTIVE | Noted: 2021-02-16

## 2021-11-07 PROBLEM — K90.49 GASTROINTESTINAL INTOLERANCE TO FOODS: Status: ACTIVE | Noted: 2021-11-07

## 2021-11-07 PROBLEM — R59.1 LYMPHADENOPATHY OF HEAD AND NECK: Status: RESOLVED | Noted: 2019-04-18 | Resolved: 2021-11-07

## 2021-11-07 PROBLEM — R12 HEARTBURN DURING PREGNANCY IN SECOND TRIMESTER: Status: ACTIVE | Noted: 2021-04-25

## 2021-11-08 ENCOUNTER — TELEPHONE (OUTPATIENT)
Dept: ADMINISTRATIVE | Facility: OTHER | Age: 21
End: 2021-11-08

## 2021-12-10 ENCOUNTER — APPOINTMENT (OUTPATIENT)
Dept: LAB | Facility: CLINIC | Age: 21
End: 2021-12-10
Payer: COMMERCIAL

## 2021-12-10 DIAGNOSIS — K90.49 GASTROINTESTINAL INTOLERANCE TO FOODS: ICD-10-CM

## 2021-12-10 DIAGNOSIS — Z00.00 ANNUAL PHYSICAL EXAM: ICD-10-CM

## 2021-12-10 DIAGNOSIS — Z11.4 SCREENING FOR HIV (HUMAN IMMUNODEFICIENCY VIRUS): ICD-10-CM

## 2021-12-10 DIAGNOSIS — Z11.59 NEED FOR HEPATITIS C SCREENING TEST: ICD-10-CM

## 2021-12-10 LAB — HCV AB SER QL: NORMAL

## 2021-12-10 PROCEDURE — 83516 IMMUNOASSAY NONANTIBODY: CPT

## 2021-12-10 PROCEDURE — 86003 ALLG SPEC IGE CRUDE XTRC EA: CPT

## 2021-12-10 PROCEDURE — 86803 HEPATITIS C AB TEST: CPT

## 2021-12-10 PROCEDURE — 82785 ASSAY OF IGE: CPT

## 2021-12-10 PROCEDURE — 86255 FLUORESCENT ANTIBODY SCREEN: CPT

## 2021-12-10 PROCEDURE — 82784 ASSAY IGA/IGD/IGG/IGM EACH: CPT

## 2021-12-10 PROCEDURE — 36415 COLL VENOUS BLD VENIPUNCTURE: CPT

## 2021-12-10 PROCEDURE — 87389 HIV-1 AG W/HIV-1&-2 AB AG IA: CPT

## 2021-12-11 LAB
ENDOMYSIUM IGA SER QL: NEGATIVE
GLIADIN PEPTIDE IGA SER-ACNC: 3 UNITS (ref 0–19)
GLIADIN PEPTIDE IGG SER-ACNC: 1 UNITS (ref 0–19)
IGA SERPL-MCNC: 246 MG/DL (ref 87–352)
TTG IGA SER-ACNC: <2 U/ML (ref 0–3)
TTG IGG SER-ACNC: <2 U/ML (ref 0–5)

## 2021-12-12 LAB — HIV 1+2 AB+HIV1 P24 AG SERPL QL IA: NORMAL

## 2021-12-13 LAB
ALLERGEN COMMENT: NORMAL
ALMOND IGE QN: <0.1 KUA/I
CASHEW NUT IGE QN: <0.1 KUA/I
CODFISH IGE QN: <0.1 KUA/I
EGG WHITE IGE QN: <0.1 KUA/I
GLUTEN IGE QN: <0.1 KUA/I
HAZELNUT IGE QN: <0.1 KUA/L
MILK IGE QN: <0.1 KUA/I
PEANUT IGE QN: <0.1 KUA/I
SALMON IGE QN: <0.1 KUA/I
SCALLOP IGE QN: <0.1 KUA/L
SESAME SEED IGE QN: <0.1 KUA/I
SHRIMP IGE QN: <0.1 KUA/L
SOYBEAN IGE QN: <0.1 KUA/I
TOTAL IGE SMQN RAST: <2 KU/L (ref 0–113)
TUNA IGE QN: <0.1 KUA/I
WALNUT IGE QN: <0.1 KUA/I
WHEAT IGE QN: <0.1 KUA/I

## 2022-05-12 DIAGNOSIS — J01.90 ACUTE SINUSITIS, RECURRENCE NOT SPECIFIED, UNSPECIFIED LOCATION: ICD-10-CM

## 2022-05-12 RX ORDER — FLUTICASONE PROPIONATE 50 MCG
2 SPRAY, SUSPENSION (ML) NASAL DAILY
Qty: 16 G | Refills: 5 | Status: SHIPPED | OUTPATIENT
Start: 2022-05-12

## 2022-06-23 ENCOUNTER — APPOINTMENT (OUTPATIENT)
Dept: LAB | Facility: CLINIC | Age: 22
End: 2022-06-23

## 2022-06-23 ENCOUNTER — APPOINTMENT (OUTPATIENT)
Dept: URGENT CARE | Facility: CLINIC | Age: 22
End: 2022-06-23

## 2022-06-23 DIAGNOSIS — Z02.1 PRE-EMPLOYMENT HEALTH SCREENING EXAMINATION: Primary | ICD-10-CM

## 2022-06-23 PROCEDURE — 36415 COLL VENOUS BLD VENIPUNCTURE: CPT

## 2022-06-23 PROCEDURE — 86480 TB TEST CELL IMMUN MEASURE: CPT | Performed by: PHYSICIAN ASSISTANT

## 2022-06-25 LAB
GAMMA INTERFERON BACKGROUND BLD IA-ACNC: 0.07 IU/ML
M TB IFN-G BLD-IMP: NEGATIVE
M TB IFN-G CD4+ BCKGRND COR BLD-ACNC: 0.02 IU/ML
M TB IFN-G CD4+ BCKGRND COR BLD-ACNC: 0.02 IU/ML
MITOGEN IGNF BCKGRD COR BLD-ACNC: >10 IU/ML

## 2023-06-19 DIAGNOSIS — Z20.1 EXPOSURE TO MYCOBACTERIUM TUBERCULOSIS: Primary | ICD-10-CM

## 2024-03-21 NOTE — TELEPHONE ENCOUNTER
Called pt,left message to call us back to schedule an appointment with Gricelda Woodson for her physical, please f/u with pt Kenalog Preparation: Kenalog How Many Mls Were Removed From The 40 Mg/Ml (5ml) Vial When Preparing The Injectable Solution?: 0 Administered By (Optional): MARIE Detail Level: Detailed Total Volume (Ccs): 0.1 Include Z78.9 (Other Specified Conditions Influencing Health Status) As An Associated Diagnosis?: No Concentration Of Kenalog Solution Injected (Mg/Ml): 10.0 Consent: The risks of atrophy were reviewed with the patient. Kenalog Type Of Vial: Multiple Dose Medical Necessity Clause: This procedure was medically necessary because the lesions that were treated were: Validate Note Data When Using Inventory: Yes Ndc# For Kenalog Only: 0919-3770-84